# Patient Record
Sex: FEMALE | Employment: UNEMPLOYED
[De-identification: names, ages, dates, MRNs, and addresses within clinical notes are randomized per-mention and may not be internally consistent; named-entity substitution may affect disease eponyms.]

---

## 2017-09-03 ENCOUNTER — HEALTH MAINTENANCE LETTER (OUTPATIENT)
Age: 52
End: 2017-09-03

## 2018-05-03 ENCOUNTER — THERAPY VISIT (OUTPATIENT)
Dept: PHYSICAL THERAPY | Facility: CLINIC | Age: 53
End: 2018-05-03
Payer: COMMERCIAL

## 2018-05-03 DIAGNOSIS — S39.012A BACK STRAIN, INITIAL ENCOUNTER: ICD-10-CM

## 2018-05-03 DIAGNOSIS — S13.9XXA NECK SPRAIN, INITIAL ENCOUNTER: Primary | ICD-10-CM

## 2018-05-03 PROCEDURE — 97161 PT EVAL LOW COMPLEX 20 MIN: CPT | Mod: GP | Performed by: PHYSICAL THERAPIST

## 2018-05-03 PROCEDURE — 97110 THERAPEUTIC EXERCISES: CPT | Mod: GP | Performed by: PHYSICAL THERAPIST

## 2018-05-03 PROCEDURE — 97140 MANUAL THERAPY 1/> REGIONS: CPT | Mod: GP | Performed by: PHYSICAL THERAPIST

## 2018-05-03 NOTE — MR AVS SNAPSHOT
"              After Visit Summary   5/3/2018    Marni Juarez    MRN: [id number]           Patient Information     Date Of Birth          1965        Visit Information        Provider Department      5/3/2018 11:30 AM Jan Ronquillo, PT [facility]        Today's Diagnoses     Neck sprain, initial encounter    -  1    Back strain, initial encounter           Follow-ups after your visit        Your next 10 appointments already scheduled     May 07, 2018 10:50 AM CDT   [facility] with Mary Her, PTA   [facility] ([facility] [place])    [address]  [address]  [place] [id number] [phone]            May 11, 2018  8:10 AM CDT   [facility] with Jan Ronquillo, PT   [facility] ([facility])    [address] [facility] [address] [facility] [facility] [address]  [address]  [place] [id number] [phone]              Who to contact     If you have questions or need follow up information about today's clinic visit or your schedule please contact [facility] directly at [phone].  Normal or non-critical lab and imaging results will be communicated to you by [employer], letter or phone within 4 business days after the clinic has received the results. If you do not hear from us within 7 days, please contact the clinic through [employer] or phone. If you have a critical or abnormal lab result, we will notify you by phone as soon as possible.  Submit refill requests through [email or web address] or call your pharmacy and they will forward the refill request to us. Please allow 3 business days for your refill to be completed.          Additional Information About Your Visit        MyChart Information     [email or web address] lets you send messages to your doctor, view your test results, renew your prescriptions, schedule appointments and more. To sign up, go to [email or web address] . Click on \"Log in\" on the left side of the screen, which will take you to the Welcome page. Then click on \"Sign up Now\" on the right side of the page.     You will be asked " to enter the access code listed below, as well as some personal information. Please follow the directions to create your username and password.     Your access code is: [email or web address]  Expires: 2018  1:07 PM     Your access code will  in 90 days. If you need help or a new code, please call your [facility] clinic or [phone].        Care EveryWhere ID     This is your Care EveryWhere ID. This could be used by other organizations to access your [facility] medical records  [id number]         Blood Pressure from Last 3 Encounters:   No data found for BP    Weight from Last 3 Encounters:   No data found for Wt              We Performed the Following     HC PT EVAL, LOW COMPLEXITY     [facility] INITIAL EVAL REPORT     MANUAL THER TECH,1+REGIONS,EA 15 MIN     THERAPEUTIC EXERCISES        Primary Care Provider Office Phone # Fax #    Xavier Lyn [phone]       [address]  [place] [address]        Equal Access to Services     [facility]: Hadii aad ku hadasho Soomaali, waaxda luqadaha, qaybta kaalmada adeegyada, waxay idiin hayaan zacharyeg oumararamaciej la'lloyd . So [facility] [phone].    ATENCIÓN: Si habla español, tiene a russell disposición servicios gratuitos de asistencia lingüística. Llame al [phone].    We comply with applicable federal civil rights laws and [place] laws. We do not discriminate on the basis of race, color, national origin, age, disability, sex, sexual orientation, or gender identity.            Thank you!     Thank you for choosing [facility]  for your care. Our goal is always to provide you with excellent care. Hearing back from our patients is one way we can continue to improve our services. Please take a few minutes to complete the written survey that you may receive in the mail after your visit with us. Thank you!             Your Updated Medication List - Protect others around you: Learn how to safely use, store and throw away your medicines at  www.disposemymeds.org.      Notice  As of 5/3/2018  1:07 PM    You have not been prescribed any medications.
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
## 2018-05-07 ENCOUNTER — THERAPY VISIT (OUTPATIENT)
Dept: PHYSICAL THERAPY | Facility: CLINIC | Age: 53
End: 2018-05-07
Payer: COMMERCIAL

## 2018-05-07 DIAGNOSIS — S39.012A BACK STRAIN, INITIAL ENCOUNTER: ICD-10-CM

## 2018-05-07 DIAGNOSIS — S13.9XXA NECK SPRAIN, INITIAL ENCOUNTER: ICD-10-CM

## 2018-05-07 PROCEDURE — 97140 MANUAL THERAPY 1/> REGIONS: CPT | Mod: GP | Performed by: PHYSICAL THERAPIST

## 2018-05-07 PROCEDURE — 97110 THERAPEUTIC EXERCISES: CPT | Mod: GP | Performed by: PHYSICAL THERAPIST

## 2018-05-11 ENCOUNTER — THERAPY VISIT (OUTPATIENT)
Dept: PHYSICAL THERAPY | Facility: CLINIC | Age: 53
End: 2018-05-11
Payer: COMMERCIAL

## 2018-05-11 DIAGNOSIS — S13.9XXA NECK SPRAIN, INITIAL ENCOUNTER: ICD-10-CM

## 2018-05-11 DIAGNOSIS — S39.012A BACK STRAIN, INITIAL ENCOUNTER: ICD-10-CM

## 2018-05-11 PROCEDURE — 97140 MANUAL THERAPY 1/> REGIONS: CPT | Mod: GP | Performed by: PHYSICAL THERAPIST

## 2018-05-11 PROCEDURE — 97110 THERAPEUTIC EXERCISES: CPT | Mod: GP | Performed by: PHYSICAL THERAPIST

## 2018-06-04 ENCOUNTER — THERAPY VISIT (OUTPATIENT)
Dept: PHYSICAL THERAPY | Facility: CLINIC | Age: 53
End: 2018-06-04
Payer: COMMERCIAL

## 2018-06-04 DIAGNOSIS — S39.012A BACK STRAIN, INITIAL ENCOUNTER: ICD-10-CM

## 2018-06-04 DIAGNOSIS — S13.9XXA NECK SPRAIN, INITIAL ENCOUNTER: ICD-10-CM

## 2018-06-04 PROCEDURE — 97140 MANUAL THERAPY 1/> REGIONS: CPT | Mod: GP | Performed by: PHYSICAL THERAPIST

## 2018-06-04 PROCEDURE — 97110 THERAPEUTIC EXERCISES: CPT | Mod: GP | Performed by: PHYSICAL THERAPIST

## 2018-06-11 ENCOUNTER — THERAPY VISIT (OUTPATIENT)
Dept: PHYSICAL THERAPY | Facility: CLINIC | Age: 53
End: 2018-06-11
Payer: COMMERCIAL

## 2018-06-11 DIAGNOSIS — S39.012A BACK STRAIN, INITIAL ENCOUNTER: ICD-10-CM

## 2018-06-11 DIAGNOSIS — S13.9XXA NECK SPRAIN, INITIAL ENCOUNTER: ICD-10-CM

## 2018-06-11 PROCEDURE — 97140 MANUAL THERAPY 1/> REGIONS: CPT | Mod: GP | Performed by: PHYSICAL THERAPIST

## 2018-06-11 PROCEDURE — 97110 THERAPEUTIC EXERCISES: CPT | Mod: GP | Performed by: PHYSICAL THERAPIST

## 2018-06-25 ENCOUNTER — THERAPY VISIT (OUTPATIENT)
Dept: PHYSICAL THERAPY | Facility: CLINIC | Age: 53
End: 2018-06-25
Payer: COMMERCIAL

## 2018-06-25 DIAGNOSIS — S13.9XXA NECK SPRAIN, INITIAL ENCOUNTER: ICD-10-CM

## 2018-06-25 DIAGNOSIS — S39.012A BACK STRAIN, INITIAL ENCOUNTER: ICD-10-CM

## 2018-06-25 PROCEDURE — 97110 THERAPEUTIC EXERCISES: CPT | Mod: GP | Performed by: PHYSICAL THERAPIST

## 2018-06-25 PROCEDURE — 97140 MANUAL THERAPY 1/> REGIONS: CPT | Mod: GP | Performed by: PHYSICAL THERAPIST

## 2018-07-02 ENCOUNTER — THERAPY VISIT (OUTPATIENT)
Dept: PHYSICAL THERAPY | Facility: CLINIC | Age: 53
End: 2018-07-02
Payer: COMMERCIAL

## 2018-07-02 DIAGNOSIS — S39.012A BACK STRAIN, INITIAL ENCOUNTER: ICD-10-CM

## 2018-07-02 DIAGNOSIS — S13.9XXA NECK SPRAIN, INITIAL ENCOUNTER: ICD-10-CM

## 2018-07-02 PROCEDURE — 97140 MANUAL THERAPY 1/> REGIONS: CPT | Mod: GP | Performed by: PHYSICAL THERAPIST

## 2018-07-02 PROCEDURE — 97110 THERAPEUTIC EXERCISES: CPT | Mod: GP | Performed by: PHYSICAL THERAPIST

## 2018-07-05 ENCOUNTER — THERAPY VISIT (OUTPATIENT)
Dept: PHYSICAL THERAPY | Facility: CLINIC | Age: 53
End: 2018-07-05
Payer: COMMERCIAL

## 2018-07-05 DIAGNOSIS — S13.9XXA NECK SPRAIN, INITIAL ENCOUNTER: ICD-10-CM

## 2018-07-05 DIAGNOSIS — S39.012A BACK STRAIN, INITIAL ENCOUNTER: ICD-10-CM

## 2018-07-05 PROCEDURE — 97110 THERAPEUTIC EXERCISES: CPT | Mod: GP | Performed by: PHYSICAL THERAPIST

## 2018-07-05 PROCEDURE — 97140 MANUAL THERAPY 1/> REGIONS: CPT | Mod: GP | Performed by: PHYSICAL THERAPIST

## 2018-07-09 ENCOUNTER — THERAPY VISIT (OUTPATIENT)
Dept: PHYSICAL THERAPY | Facility: CLINIC | Age: 53
End: 2018-07-09
Payer: COMMERCIAL

## 2018-07-09 DIAGNOSIS — S39.012A BACK STRAIN, INITIAL ENCOUNTER: ICD-10-CM

## 2018-07-09 DIAGNOSIS — S13.9XXA NECK SPRAIN, INITIAL ENCOUNTER: ICD-10-CM

## 2018-07-09 PROCEDURE — 97110 THERAPEUTIC EXERCISES: CPT | Mod: GP | Performed by: PHYSICAL THERAPIST

## 2018-07-09 PROCEDURE — 97112 NEUROMUSCULAR REEDUCATION: CPT | Mod: GP | Performed by: PHYSICAL THERAPIST

## 2018-07-09 PROCEDURE — 97140 MANUAL THERAPY 1/> REGIONS: CPT | Mod: GP | Performed by: PHYSICAL THERAPIST

## 2018-07-12 ENCOUNTER — THERAPY VISIT (OUTPATIENT)
Dept: PHYSICAL THERAPY | Facility: CLINIC | Age: 53
End: 2018-07-12
Payer: COMMERCIAL

## 2018-07-12 DIAGNOSIS — S13.9XXA NECK SPRAIN, INITIAL ENCOUNTER: ICD-10-CM

## 2018-07-12 DIAGNOSIS — S39.012A BACK STRAIN, INITIAL ENCOUNTER: ICD-10-CM

## 2018-07-12 PROCEDURE — 97110 THERAPEUTIC EXERCISES: CPT | Mod: GP

## 2018-07-12 PROCEDURE — 97140 MANUAL THERAPY 1/> REGIONS: CPT | Mod: GP

## 2018-07-16 ENCOUNTER — THERAPY VISIT (OUTPATIENT)
Dept: PHYSICAL THERAPY | Facility: CLINIC | Age: 53
End: 2018-07-16
Payer: COMMERCIAL

## 2018-07-16 DIAGNOSIS — S39.012A BACK STRAIN, INITIAL ENCOUNTER: ICD-10-CM

## 2018-07-16 DIAGNOSIS — S13.9XXA NECK SPRAIN, INITIAL ENCOUNTER: ICD-10-CM

## 2018-07-16 PROCEDURE — 97110 THERAPEUTIC EXERCISES: CPT | Mod: GP | Performed by: PHYSICAL THERAPIST

## 2018-07-16 PROCEDURE — 97140 MANUAL THERAPY 1/> REGIONS: CPT | Mod: GP | Performed by: PHYSICAL THERAPIST

## 2018-07-19 ENCOUNTER — THERAPY VISIT (OUTPATIENT)
Dept: PHYSICAL THERAPY | Facility: CLINIC | Age: 53
End: 2018-07-19
Payer: COMMERCIAL

## 2018-07-19 DIAGNOSIS — S39.012A BACK STRAIN, INITIAL ENCOUNTER: ICD-10-CM

## 2018-07-19 DIAGNOSIS — S13.9XXA NECK SPRAIN, INITIAL ENCOUNTER: ICD-10-CM

## 2018-07-19 PROCEDURE — 97110 THERAPEUTIC EXERCISES: CPT | Mod: GP | Performed by: PHYSICAL THERAPIST

## 2018-07-19 PROCEDURE — 97140 MANUAL THERAPY 1/> REGIONS: CPT | Mod: GP | Performed by: PHYSICAL THERAPIST

## 2018-07-24 ENCOUNTER — THERAPY VISIT (OUTPATIENT)
Dept: PHYSICAL THERAPY | Facility: CLINIC | Age: 53
End: 2018-07-24
Payer: COMMERCIAL

## 2018-07-24 DIAGNOSIS — S39.012A BACK STRAIN, INITIAL ENCOUNTER: ICD-10-CM

## 2018-07-24 DIAGNOSIS — S13.9XXA NECK SPRAIN, INITIAL ENCOUNTER: ICD-10-CM

## 2018-07-24 PROCEDURE — 97140 MANUAL THERAPY 1/> REGIONS: CPT | Mod: GP | Performed by: PHYSICAL THERAPIST

## 2018-07-24 PROCEDURE — 97110 THERAPEUTIC EXERCISES: CPT | Mod: GP | Performed by: PHYSICAL THERAPIST

## 2018-07-26 ENCOUNTER — THERAPY VISIT (OUTPATIENT)
Dept: PHYSICAL THERAPY | Facility: CLINIC | Age: 53
End: 2018-07-26
Payer: COMMERCIAL

## 2018-07-26 DIAGNOSIS — S13.9XXA NECK SPRAIN, INITIAL ENCOUNTER: ICD-10-CM

## 2018-07-26 DIAGNOSIS — S39.012A BACK STRAIN, INITIAL ENCOUNTER: ICD-10-CM

## 2018-07-26 PROCEDURE — 97110 THERAPEUTIC EXERCISES: CPT | Mod: GP | Performed by: PHYSICAL THERAPIST

## 2018-07-26 PROCEDURE — 97140 MANUAL THERAPY 1/> REGIONS: CPT | Mod: GP | Performed by: PHYSICAL THERAPIST

## 2018-07-30 ENCOUNTER — THERAPY VISIT (OUTPATIENT)
Dept: PHYSICAL THERAPY | Facility: CLINIC | Age: 53
End: 2018-07-30
Payer: COMMERCIAL

## 2018-07-30 DIAGNOSIS — S39.012A BACK STRAIN, INITIAL ENCOUNTER: ICD-10-CM

## 2018-07-30 DIAGNOSIS — S13.9XXA NECK SPRAIN, INITIAL ENCOUNTER: ICD-10-CM

## 2018-07-30 PROCEDURE — 97140 MANUAL THERAPY 1/> REGIONS: CPT | Mod: GP | Performed by: PHYSICAL THERAPIST

## 2018-07-30 PROCEDURE — 97110 THERAPEUTIC EXERCISES: CPT | Mod: GP | Performed by: PHYSICAL THERAPIST

## 2018-08-03 ENCOUNTER — THERAPY VISIT (OUTPATIENT)
Dept: PHYSICAL THERAPY | Facility: CLINIC | Age: 53
End: 2018-08-03
Payer: COMMERCIAL

## 2018-08-03 DIAGNOSIS — S39.012A BACK STRAIN, INITIAL ENCOUNTER: ICD-10-CM

## 2018-08-03 DIAGNOSIS — S13.9XXA NECK SPRAIN, INITIAL ENCOUNTER: ICD-10-CM

## 2018-08-03 PROCEDURE — 97110 THERAPEUTIC EXERCISES: CPT | Mod: GP | Performed by: PHYSICAL THERAPIST

## 2018-08-03 PROCEDURE — 97140 MANUAL THERAPY 1/> REGIONS: CPT | Mod: GP | Performed by: PHYSICAL THERAPIST

## 2018-08-13 ENCOUNTER — THERAPY VISIT (OUTPATIENT)
Dept: PHYSICAL THERAPY | Facility: CLINIC | Age: 53
End: 2018-08-13
Payer: COMMERCIAL

## 2018-08-13 DIAGNOSIS — S39.012A BACK STRAIN, INITIAL ENCOUNTER: ICD-10-CM

## 2018-08-13 DIAGNOSIS — S13.9XXA NECK SPRAIN, INITIAL ENCOUNTER: ICD-10-CM

## 2018-08-13 PROCEDURE — 97140 MANUAL THERAPY 1/> REGIONS: CPT | Mod: GP

## 2018-08-13 PROCEDURE — 97110 THERAPEUTIC EXERCISES: CPT | Mod: GP

## 2018-08-17 ENCOUNTER — THERAPY VISIT (OUTPATIENT)
Dept: PHYSICAL THERAPY | Facility: CLINIC | Age: 53
End: 2018-08-17
Payer: COMMERCIAL

## 2018-08-17 DIAGNOSIS — S39.012A BACK STRAIN, INITIAL ENCOUNTER: ICD-10-CM

## 2018-08-17 DIAGNOSIS — S13.9XXA NECK SPRAIN, INITIAL ENCOUNTER: ICD-10-CM

## 2018-08-17 PROCEDURE — 97012 MECHANICAL TRACTION THERAPY: CPT | Mod: GP | Performed by: PHYSICAL THERAPIST

## 2018-08-17 PROCEDURE — 97110 THERAPEUTIC EXERCISES: CPT | Mod: GP | Performed by: PHYSICAL THERAPIST

## 2018-08-20 ENCOUNTER — THERAPY VISIT (OUTPATIENT)
Dept: PHYSICAL THERAPY | Facility: CLINIC | Age: 53
End: 2018-08-20
Payer: COMMERCIAL

## 2018-08-20 DIAGNOSIS — S39.012A BACK STRAIN, INITIAL ENCOUNTER: ICD-10-CM

## 2018-08-20 DIAGNOSIS — S13.9XXA NECK SPRAIN, INITIAL ENCOUNTER: ICD-10-CM

## 2018-08-20 PROCEDURE — 97110 THERAPEUTIC EXERCISES: CPT | Mod: GP | Performed by: PHYSICAL THERAPIST

## 2018-08-20 PROCEDURE — 97012 MECHANICAL TRACTION THERAPY: CPT | Mod: GP | Performed by: PHYSICAL THERAPIST

## 2018-08-24 ENCOUNTER — THERAPY VISIT (OUTPATIENT)
Dept: PHYSICAL THERAPY | Facility: CLINIC | Age: 53
End: 2018-08-24
Payer: COMMERCIAL

## 2018-08-24 DIAGNOSIS — S13.9XXA NECK SPRAIN, INITIAL ENCOUNTER: ICD-10-CM

## 2018-08-24 DIAGNOSIS — S39.012A BACK STRAIN, INITIAL ENCOUNTER: ICD-10-CM

## 2018-08-24 PROCEDURE — 97012 MECHANICAL TRACTION THERAPY: CPT | Mod: GP | Performed by: PHYSICAL THERAPIST

## 2018-08-24 PROCEDURE — 97140 MANUAL THERAPY 1/> REGIONS: CPT | Mod: GP | Performed by: PHYSICAL THERAPIST

## 2018-08-24 PROCEDURE — 97110 THERAPEUTIC EXERCISES: CPT | Mod: GP | Performed by: PHYSICAL THERAPIST

## 2018-08-24 NOTE — LETTER
[facility]  [address]  [address]  [place] [address]  [phone]    2018    Re: Marni Juarez   :   1965  MRN:  [id number]   REFERRING PHYSICIAN:   MD SHEILA Goodwin PT  Date of Initial Evaluation:  2018  Visits:  Rxs Used: 20  Reason for Referral:     Neck sprain, initial encounter  Back strain, initial encounter    EVALUATION SUMMARY    PROGRESS  REPORT  Progress reporting period is from 5.3 to 8..18.     SUBJECTIVE  Subjective: more pain over my sacrum/LS, twinges into my gluts, feel im able to walk easier since traction , neck/HA pain is less overall but remains,  PT is helping    Current Pain level: 7/10   Initial Pain level: 10/10   Changes in function: Yes, see goal flow sheet for change in function   Adverse reactions: None;   ,     The subjective and objective information are from the last SOAP note on this patient.    OBJECTIVE  Objective: 20 visits to date.  Findings: numerous triggers of gluts/multif, moderate loss of LS extension, functional AROM but reports of stiffness/tightness, sharp pain limiting activities.  Pt remains motivated/compliant but her anxiety is a hinderance to therapy. Pt remains guarded, self restricting, anxious about PT progression/increases.        ASSESSMENT/PLAN  Updated problem list and treatment plan: Diagnosis 1:  LBP  Pain -  mechanical traction, manual therapy, self management and home program  Decreased ROM/flexibility - manual therapy, therapeutic exercise and home program  Decreased joint mobility - manual therapy and therapeutic exercise  Impaired muscle performance - neuro re-education  Decreased function - therapeutic activities    Re: Marni Juarez   :   1965    Diagnosis 2:  Neck/HA pain    Pain -  mechanical traction, manual therapy, self management, directional preference exercise and home program  Decreased ROM/flexibility - manual therapy and therapeutic exercise  Decreased joint  mobility - manual therapy and therapeutic exercise  Decreased proprioception - neuro re-education and therapeutic activities  Impaired muscle performance - neuro re-education  Decreased function - therapeutic activities  Impaired posture - neuro re-education  STG/LTGs have been met or progress has been made towards goals:  Yes (See Goal flow sheet completed today.)  Assessment of Progress: The patient's condition is improving.  Patient is meeting short term goals and is progressing towards long term goals.  Self Management Plans:  Patient has been instructed in a home treatment program.  Patient  has been instructed in self management of symptoms.  I have re-evaluated this patient and find that the nature, scope, duration and intensity of the therapy is appropriate for the medical condition of the patient.  Enrica continues to require the following intervention to meet STG and LTG's: PT, theraputic exs, manual therapies, traction.   The patient is returning to your office for a recheck appointment.    Recommendations:  This patient would benefit from continued therapy.     Frequency:  1 X week, once daily  Duration:  for 8 weeks    Please refer to the daily flowsheet for treatment today, total treatment time and time spent performing 1:1 timed codes.    Thank you for your referral.    INQUIRIES  Therapist: Jan Ronquillo PT   [facility]  [address]  [address]  [place] [address]  Phone: [phone]  Fax: [phone]

## 2018-08-24 NOTE — PROGRESS NOTES
Subjective:  HPI                    Objective:  System    Physical Exam    General     ROS    Assessment/Plan:    PROGRESS  REPORT    Progress reporting period is from 5.3 to 8.24.18.     SUBJECTIVE  Subjective: more pain over my sacrum/LS, twinges into my gluts, feel im able to walk easier since traction , neck/HA pain is less overall but remains,  PT is helping    Current Pain level: 7/10   Initial Pain level: 10/10   Changes in function: Yes, see goal flow sheet for change in function   Adverse reactions: None;   ,     The subjective and objective information are from the last SOAP note on this patient.    OBJECTIVE  Objective: 20 visits to date.  Findings: numerous triggers of gluts/multif, moderate loss of LS extension, functional AROM but reports of stiffness/tightness, sharp pain limiting activities.  Pt remains motivated/compliant but her anxiety is a hinderance to therapy. Pt remains guarded, self restricting, anxious about PT progression/increases.        ASSESSMENT/PLAN  Updated problem list and treatment plan: Diagnosis 1:  LBP  Pain -  mechanical traction, manual therapy, self management and home program  Decreased ROM/flexibility - manual therapy, therapeutic exercise and home program  Decreased joint mobility - manual therapy and therapeutic exercise  Impaired muscle performance - neuro re-education  Decreased function - therapeutic activities  Diagnosis 2:  Neck/HA pain    Pain -  mechanical traction, manual therapy, self management, directional preference exercise and home program  Decreased ROM/flexibility - manual therapy and therapeutic exercise  Decreased joint mobility - manual therapy and therapeutic exercise  Decreased proprioception - neuro re-education and therapeutic activities  Impaired muscle performance - neuro re-education  Decreased function - therapeutic activities  Impaired posture - neuro re-education  STG/LTGs have been met or progress has been made towards goals:  Yes (See Goal  flow sheet completed today.)  Assessment of Progress: The patient's condition is improving.  Patient is meeting short term goals and is progressing towards long term goals.  Self Management Plans:  Patient has been instructed in a home treatment program.  Patient  has been instructed in self management of symptoms.  I have re-evaluated this patient and find that the nature, scope, duration and intensity of the therapy is appropriate for the medical condition of the patient.  Enrica continues to require the following intervention to meet STG and LTG's: PT, theraputic exs, manual therapies, traction.   The patient is returning to your office for a recheck appointment.    Recommendations:  This patient would benefit from continued therapy.     Frequency:  1 X week, once daily  Duration:  for 8 weeks        Please refer to the daily flowsheet for treatment today, total treatment time and time spent performing 1:1 timed codes.

## 2018-08-28 ENCOUNTER — THERAPY VISIT (OUTPATIENT)
Dept: PHYSICAL THERAPY | Facility: CLINIC | Age: 53
End: 2018-08-28
Payer: COMMERCIAL

## 2018-08-28 DIAGNOSIS — S39.012A BACK STRAIN, INITIAL ENCOUNTER: ICD-10-CM

## 2018-08-28 DIAGNOSIS — S13.9XXA NECK SPRAIN, INITIAL ENCOUNTER: ICD-10-CM

## 2018-08-28 PROCEDURE — 97012 MECHANICAL TRACTION THERAPY: CPT | Mod: GP | Performed by: PHYSICAL THERAPIST

## 2018-08-28 PROCEDURE — 97110 THERAPEUTIC EXERCISES: CPT | Mod: GP | Performed by: PHYSICAL THERAPIST

## 2018-08-28 NOTE — MR AVS SNAPSHOT
After Visit Summary   8/28/2018    Marni Juarez    MRN: [id number]           Patient Information     Date Of Birth          1965        Visit Information        Provider Department      8/28/2018 10:50 AM Jan Ronquillo, PT [facility]        Today's Diagnoses     Neck sprain, initial encounter        Back strain, initial encounter           Follow-ups after your visit        Your next 10 appointments already scheduled     Aug 31, 2018 10:50 AM CDT   [facility] Spine with Jan Ronquillo, PT   [facility] ([facility] [place])    [address]  [address]  [place] [address]   [phone]            Sep 06, 2018 10:10 AM CDT   S[facility]IL[facility] Spine with Jan Ronquillo, PT   [facility] ([facility] [place])    [address]  [address]  [place] [address]   [phone]            Sep 10, 2018 10:50 AM CDT   S[facility]IL[facility] Spine with Mary Her, PTA   [facility] ([facility] [place])    [address]  [address]  [place] [address]   [phone]              Who to contact     If you have questions or need follow up information about today's clinic visit or your schedule please contact [facility] directly at [phone].  Normal or non-critical lab and imaging results will be communicated to you by MyChart, letter or phone within 4 business days after the clinic has received the results. If you do not hear from us within 7 days, please contact the clinic through MyChart or phone. If you have a critical or abnormal lab result, we will notify you by phone as soon as possible.  Submit refill requests through [employer] or call your pharmacy and they will forward the refill request to us. Please allow 3 business days for your refill to be completed.          Additional Information About Your Visit        Care EveryWhere ID     This is your Care EveryWhere ID. This could be used by other organizations to access your [facility] medical records  [id number]         Blood Pressure from Last 3  Encounters:   No data found for BP    Weight from Last 3 Encounters:   No data found for Wt              We Performed the Following     MECHANICAL TRACTION THERAPY     THERAPEUTIC EXERCISES        Primary Care Provider Office Phone # Fax #    Xavier Lyn [phone] [address] [place] [address]        Equal Access to Services     [facility]: Hadii aad ku hadasho Soomaali, waaxda luqadaha, qaybta kaalmada adeegyada, waxay idiin hayaan adeeg kharash la'aan . So [facility] [phone].    ATENCIÓN: Si habla español, tiene a russell disposición servicios gratuitos de asistencia lingüística. Llame al [phone].    We comply with applicable federal civil rights laws and [place] laws. We do not discriminate on the basis of race, color, national origin, age, disability, sex, sexual orientation, or gender identity.            Thank you!     Thank you for choosing [facility]  for your care. Our goal is always to provide you with excellent care. Hearing back from our patients is one way we can continue to improve our services. Please take a few minutes to complete the written survey that you may receive in the mail after your visit with us. Thank you!             Your Updated Medication List - Protect others around you: Learn how to safely use, store and throw away your medicines at www.disposemymeds.org.      Notice  As of 8/28/2018  1:19 PM    You have not been prescribed any medications.

## 2018-11-30 PROBLEM — S13.9XXA NECK SPRAIN, INITIAL ENCOUNTER: Status: RESOLVED | Noted: 2018-05-03 | Resolved: 2018-11-30

## 2018-11-30 PROBLEM — S39.012A BACK STRAIN, INITIAL ENCOUNTER: Status: RESOLVED | Noted: 2018-05-03 | Resolved: 2018-11-30

## 2018-11-30 NOTE — PROGRESS NOTES
Subjective:  HPI                    Objective:  System    Physical Exam    General     ROS    Assessment/Plan:    DISCHARGE REPORT    Progress reporting period is from 5.3 to 8.24.18.     SUBJECTIVE  Subjective: pt describes mch worse LB pain today and since the weekend, she did alot of light lifting but doesnt think it hurt her. was to [place] yesterday    Current Pain level: 6/10   Initial Pain level: 10/10   Changes in function: No changes noted in function since last SOAP note   Adverse reactions: None;   ,     Patient has failed to return to therapy so current objective findings are unknown.    OBJECTIVE  Objective: pt is very self restricting today, seems very agitated and irritated,  Overall ROM is improved and focus of her compliants is her LB.       ASSESSMENT/PLAN  Updated problem list and treatment plan: Diagnosis 1:  Neck/HA pain     STG/LTGs have been met or progress has been made towards goals:  None  Assessment of Progress: Patient has not returned to therapy.  Current status is unknown and discharge G code cannot be reported.  Self Management Plans:  Patient has been instructed in a home treatment program.  Patient  has been instructed in self management of symptoms.    Marni continues to require the following intervention to meet STG and LTG's:   The patient failed to complete scheduled/ordered appointments so current information is unknown.  We will discharge this patient from PT.    Recommendations:      Please refer to the daily flowsheet for treatment today, total treatment time and time spent performing 1:1 timed codes.

## 2020-04-13 ENCOUNTER — TRANSFERRED RECORDS (OUTPATIENT)
Dept: HEALTH INFORMATION MANAGEMENT | Facility: CLINIC | Age: 55
End: 2020-04-13

## 2020-07-07 ENCOUNTER — TRANSFERRED RECORDS (OUTPATIENT)
Dept: HEALTH INFORMATION MANAGEMENT | Facility: CLINIC | Age: 55
End: 2020-07-07

## 2020-07-20 ENCOUNTER — TRANSFERRED RECORDS (OUTPATIENT)
Dept: HEALTH INFORMATION MANAGEMENT | Facility: CLINIC | Age: 55
End: 2020-07-20

## 2020-08-14 ENCOUNTER — TRANSFERRED RECORDS (OUTPATIENT)
Dept: HEALTH INFORMATION MANAGEMENT | Facility: CLINIC | Age: 55
End: 2020-08-14

## 2020-08-18 ENCOUNTER — TRANSCRIBE ORDERS (OUTPATIENT)
Dept: OTHER | Age: 55
End: 2020-08-18

## 2020-08-18 DIAGNOSIS — H81.09 MENIERE'S DISEASE, UNSPECIFIED LATERALITY: Primary | ICD-10-CM

## 2020-08-18 DIAGNOSIS — R42 VERTIGO: ICD-10-CM

## 2020-08-26 ENCOUNTER — TELEPHONE (OUTPATIENT)
Dept: OTOLARYNGOLOGY | Facility: CLINIC | Age: 55
End: 2020-08-26

## 2020-08-26 NOTE — TELEPHONE ENCOUNTER
FUTURE VISIT INFORMATION      FUTURE VISIT INFORMATION:    Date: 10/20/2020    Time: 11:30AM    [place] [facility] [place] INFORMATION:    Referring provider:  Raz Almaguer DO     Referring providers clinic:  [facility]     Reason for visit/diagnosis  Telephone Visit- Vertigo- Referred by [facility]    RECORDS REQUESTED FROM:       Clinic name Comments Records Status Imaging Status   [facility]  8/14/2020 note from Raz Almaguer DO   7/7/2020 Audiogram  Sent to scan 8/26/2020 - requested more recs 8/26    [facility]    4/13/2020 Urgent Care note req 8/26/2020    req 9/4/2020 - sent to scan     [facility] 7/20/2020 MRI Brain IAC req 8/26/2020 - sent to scan 8/27 req 8/26/2020    req 9/4/2020 - PACS                        8/26/2020 2:17PM sent a fax to [facility] for recs, [facility] for images, and images and [facility] for more recs - Amay   8/27/2020 9:36AM report and images from [facility] received, waiting for [facility] recs - Amay   9/4/2020 7:07AM sent a 2nd fax to [facility] for recs and [facility] for images - Amay   *urgent care note received from [place] and images received from [facility]. Sent a message to Audiology to review - Amay

## 2020-08-26 NOTE — TELEPHONE ENCOUNTER
1. Have you noticed any changes in hearing? Yes  2. Do you have ringing, buzzing, or other sounds in your ears or head, this is also referred to as Tinnitus? Yes  3. When and where was your last hearing test? [facility] 7/2020  4. Do you feel lightheaded or foggy? Yes  5. Do you have a spinning sensation? Yes  6. Is there any specific position that can bring on dizziness? random  7. Does looking up cause dizziness? Yes  8. Does getting in and our of bed cause dizziness? No  9. Does turning over in bed increase or cause dizziness? Sometimes: sometimes   10. Does bending over cause dizziness? Sometimes: sometimes   11. Is there anything that you can do to prevent the dizziness? medicine  12. Has the dizziness gotten better with time? No  13. Have you seen Physical Therapy for dizziness? (Please indicate clinic and as much of the location as possible): No  14. Are you being referred to a specific physician? No  15. Have you been evaluated/treated for your dizziness at any other location?  (If yes,obtian as much clinic/provider/locaiton as possible) Yes. (If yes answer the following questions:)   Have you seen any ENT, Neurology, or other providers for these symptoms?             Yes, If yes, where? [facility], [facility]   if yes, who?    Have you had any balance or Audiology testing? No Have you had an MRI or CT scan of your head or neck? Yes, If yes, where? [facility] 7/20 if yes, who?     Would you like to receive your Release of Information by mail or e-mail?  mail

## 2020-09-08 DIAGNOSIS — R42 DIZZINESS: Primary | ICD-10-CM

## 2020-09-08 NOTE — TELEPHONE ENCOUNTER
Orders requested for hearing test, VNG/rotary chair, and Ecog testing.    Per Chart Review: Patient is being referred for right Meniere's disease.    Was seen at [facility] on 4/13/20 for dizziness and nausea, which was worsened by movement. Patient had also reported some tinnitus and aural fullness at the time. She was prescribed meclizine and Zofran.    [facility]: Per 8/14/20 note, patient has a recent diagnosis of right Meniere's disease. She has reportedly had multiple episodes of vertigo with nausea and vomiting, has right sensorineural hearing loss (no audiogram to review), and bilateral tinnitus. She reportedly adheres to a low salt diet and limits her caffeine and alcohol intake. A diuretic was recommended.     Other pertinent medical information: Absent left eye    MRI: 7/20/20- Impressions  - Normal internal auditory canals  - Encephalomalacia & gliosis in left anterior lobe may be due to previous ischemic or traumatic insult. Also chronic microangiopathic changes without evidence of acute intracranial pathology.  - Post-operative changes left orbit with an absent globe. Also a hypointense 12mm implant in left lateral orbit.    Shaheen Galeano.  Licensed Audiologist  [place] # [id number]

## 2020-09-24 ENCOUNTER — TRANSFERRED RECORDS (OUTPATIENT)
Dept: PHYSICAL THERAPY | Facility: CLINIC | Age: 55
End: 2020-09-24

## 2020-10-02 ENCOUNTER — OFFICE VISIT (OUTPATIENT)
Dept: AUDIOLOGY | Facility: CLINIC | Age: 55
End: 2020-10-02
Payer: COMMERCIAL

## 2020-10-02 ENCOUNTER — OFFICE VISIT (OUTPATIENT)
Dept: AUDIOLOGY | Facility: CLINIC | Age: 55
End: 2020-10-02
Attending: OTOLARYNGOLOGY
Payer: COMMERCIAL

## 2020-10-02 DIAGNOSIS — H90.71 MIXED CONDUCTIVE AND SENSORINEURAL HEARING LOSS OF RIGHT EAR WITH UNRESTRICTED HEARING OF LEFT EAR: Primary | ICD-10-CM

## 2020-10-02 DIAGNOSIS — R42 DIZZINESS AND GIDDINESS: Primary | ICD-10-CM

## 2020-10-02 PROCEDURE — 92546 SINUSOIDAL ROTATIONAL TEST: CPT | Performed by: AUDIOLOGIST

## 2020-10-02 PROCEDURE — 92565 STENGER TEST PURE TONE: CPT | Performed by: AUDIOLOGIST

## 2020-10-02 PROCEDURE — 92557 COMPREHENSIVE HEARING TEST: CPT | Performed by: AUDIOLOGIST

## 2020-10-02 PROCEDURE — 92550 TYMPANOMETRY & REFLEX THRESH: CPT | Performed by: AUDIOLOGIST

## 2020-10-02 PROCEDURE — 92537 CALORIC VSTBLR TEST W/REC: CPT | Performed by: AUDIOLOGIST

## 2020-10-02 PROCEDURE — 92541 SPONTANEOUS NYSTAGMUS TEST: CPT | Performed by: AUDIOLOGIST

## 2020-10-02 PROCEDURE — 92542 POSITIONAL NYSTAGMUS TEST: CPT | Mod: 59 | Performed by: AUDIOLOGIST

## 2020-10-02 PROCEDURE — 92584 ELECTROCOCHLEOGRAPHY: CPT | Performed by: AUDIOLOGIST

## 2020-10-02 PROCEDURE — 92545 OSCILLATING TRACKING TEST: CPT | Mod: 59 | Performed by: AUDIOLOGIST

## 2020-10-02 NOTE — PROGRESS NOTES
AUDIOLOGY REPORT    SUMMARY: Audiology visit completed. See audiogram for results.      RECOMMENDATIONS: Follow-up with ENT.    Kushal Iniguez, [facility]  Licensed Audiologist  [place] #[id number]

## 2020-10-02 NOTE — PROGRESS NOTES
AUDIOLOGY REPORT-BALANCE ASSESSMENT    SUBJECTIVE: Marni Juarez, 55 year old, was seen in Audiology at the [facility] on 10/2/2020, for videonystagmography (VNG) and rotational chair testing referred by Rick Nissen, M.D. Patient presents with chief complaints of dizziness, vertigo, nausea, right tinnitus, and imbalance.     Patient reports symptoms originally onset 26 years ago while she was living in [place]. Symptoms then resolved until approximately one year later when she experienced a similar episode. She was then without symptoms until recently. In April 2020, patient reports experiencing a sudden onset of vertigo, dizziness, imbalance and nausea which resulted in emesis while sitting at her kitchen table. She sought emergency medical evaluation with [facility] and was prescribed a steroid, meclizine and Zofran. Patient also noted some right aural fullness and tinnitus at this time, which improved following her prescription of steroids. She does continue to note some persistent tinnitus in her right ear. Patient reports she was then asymptomatic until the second and third weeks of August 2020. Over the course of these two weeks, patient reports experiencing another six episodes; three while driving and three while doing other things. Patient describes her episodes as lasting hours in duration. Symptoms can be provoked or exacerbated by any positional changes, fast head or body movements, and with any eye movements, even minor eye movements. Patient then reports seeking evaluation with an otolaryngologist in [place]. She was reportedly prescribed another dose of steroids and a diuretic as Meniere's disease was suspected. Today, she reports she has been without vertigo since August. She reports experiencing a few small momentary incidences of dizziness upon bending forward a few days ago. She has otherwise been without symptoms.     Patient reports  "history of cancer as a child which resulted in the need for surgical removal of her left eye. She now utilizes her right eye only for vision and wears an eye patch over her left orbital area. Patient denies history of headaches, migraines, sensitivity to loud sounds, head injuries or concussions. Patient reports some sensitivity to bright lights. She denies tullio's phenomenon, hennebert's sign, and autophony. Patient denies sensation of persistent motion post cessation of motion. She denies sensation of motion while still. She denies double or blurred vision. Patient reports difficulty falling asleep at night. She also notes waking up in the middle of the night to her \"house shaking\" approximately four times a year. Patient reports additional medical history includes; blocked sublingual gland with subsequent surgery following a bout of the flu, bronchitis which turned into sinus concerns (winter 2019), branch retinal vein occlusion (1 year ago) with more recent inflammation in the same area in her right eye, and left knee injury with subsequent physical therapy (1 year ago). Family history is negative for hearing loss, migraines and vertigo. Patient denies consumption of caffeinated beverages, nicotine, alcoholic substances, or use of medications with known vestibular interactions within the past 48 hours.    OBJECTIVE:    Dizziness Handicap Inventory (DHI): Patient did not complete.    Rotational chair testing:   Sinusoidal harmonic acceleration test: 0.01, 0.02, 0.04, 0.08, 0.16, 0.32, and 0.64 Hz.  Spontaneous nystagmus: right beats  Phase: Normal rising to abnormal high frequency phase lead (0.16-0.64 Hz)  Gain: Normal  Symmetry: Left asymmetry (0.02-0.64 Hz)  Spectral Purity: Good  Overall rotational chair test: Normal gain, normal rising to abnormal high frequency phase lead (0.16-0.64 Hz), left asymmetry (0.02-0.64 Hz) and good spectral purity; pattern suggests central influences. *Left asymmetry likely due " to spontaneous right beating nystagmus.     Videonystagmography (VNG) testing:  Prescreening:  Tympanograms: Normal eardrum mobility bilaterally. Note: this test is completed to determine the status of the middle ear before irrigations are completed. *Patient see audiogram performed earlier today for tympanograms.   Ocular range of motion and ocular counter roll: Normal  Cross/cover: Normal  Head Thrust: Negative     Nystagmus Tests:  Gaze-Horizontal with Fixation:   Center: 1-2 deg/s right beating nystagmus   Right: slight 1 deg/s right beating nystagmus   Left: intermittent 1 deg/s right beating nystagmus, patient had difficulty maintaining appropriate gaze position.   Gaze-Vertical with Fixation:   Up: intermittent slight 1-2 deg/s right beating nystagmus   Down: intermittent slight 1-2 deg/s right beating nystagmus  Gaze with Fixation Denied   Center: 4 deg/s right beating nystagmus decreases to 2-3 deg/s right beating nystagmus with fixation but unable to suppress with fixation.    Right: 3 deg/s right beating nystagmus   Left: 1 deg/s right beating nystagmus   Up: 4 deg/s right beating nystagmus  High Frequency Headshake:   Horizontal: Positive. 9 deg/s right beating nystagmus, patient reports mild dizziness post headshake.    Vertical: Positive. 5 deg/s right beating nystagmus, no symptoms.     Anvneet-Hallpike Head Right: Negative for PC BPPV. Slight intermittent 1-2 deg/s right beating nystagmus mixed with eyeblinks. No symptoms.   Navneet-Hallpike Head Left: Negative for PC BPPV. Non-fatiguing 1 deg/s right beating nystagmus, no symptoms.   Roll Test Head Right: Negative for HC BPPV. 1-2 deg/s right beating nystagmus, no symptoms.   Roll Test Head Left: Negative for HC BPPV. Intermittent 1 deg/s right beating nystagmus, no symptoms.     Positional Testing:  Positionals: Supine: 2 deg/s right beating nystagmus, unable to suppress with fixation. No symptoms.   Positionals: Body Right: 3-4 deg/s right beating  nystagmus, unable to suppress with fixation. No symptoms.   Positionals: Body Left: 2 deg/s right beating nystagmus mixed with eye blinks, unable to suppress with fixation. No symptoms.   Positionals: Pre-Caloric: 2 deg/s right beating nystagmus, unable to suppress with fixation. No symptoms.     Oculomotor Tests:  Saccades: Essentially normal upon repeat.  Pursuit(repeatable): Abnormal with overshoot and mild saccadic intrusions throughout.     Calorics:  (Tested at 44 degrees and 30 degrees Celsius for 30 seconds for warm and cool water, respectively):  Right Warm Eye Speed: 75 degrees per second right beating  Left Warm Eye Speed: 44 degrees per second left beating  Right Cool Eye Speed: 27 degrees per second left beating  Left Cool Eye Speed: 29 degrees per second right beating  Difference between ear: 17% left hypofunction. (Greater than 25% considered clinically significant.)  Fixation Index: 0.04 Normal  Overall caloric test: Normal    ASSESSMENT:  1. Indications of central vestibular system involvement noted on today's exam were as follows:     -Spontaneous right beating nystagmus (1-4 deg/s) present with and without fixation evident throughout all testing.     -Abnormal Pursuit testing; repeatable.    -Abnormal Rotary Chair Testing; pattern suggests central influences.     2. There were no significant indications of peripheral vestibular system involvement noted on today's exam.     3. Non-localizing Findings:    -Positive High Frequency Headshake; in the absence of a significant hypofunction this is a non-localizing finding.       PLAN:  Proceed with electrocochleography (EcochG) testing as scheduled. Follow-up with Dr. Rick Nissen regarding today's results and for medical management on 10/20/2020. Please call this clinic at [phone] with questions regarding these results or recommendations.       Shaheen Hook. CCC-A  Licensed Audiologist   [place] #[id number]

## 2020-10-02 NOTE — PROGRESS NOTES
AUDIOLOGY REPORT    BACKGROUND INFORMATION: Marni Juarez was seen in Audiology at the [facility] on 10/2/2020 for an electrocochleography (ECochG) evaluation, referred by Rick Nissen, M.D. The following case history was collected earlier today by Kushal Hook during balance testing appointment:    Patient reports symptoms originally onset 26 years ago while she was living in [place]. Symptoms then resolved until approximately one year later when she experienced a similar episode. She was then without symptoms until recently. In April 2020, patient reports experiencing a sudden onset of vertigo, dizziness, imbalance and nausea which resulted in emesis while sitting at her kitchen table. She sought emergency medical evaluation with [facility] and was prescribed a steroid, meclizine and Zofran. Patient also noted some right aural fullness and tinnitus at this time, which improved following her prescription of steroids. She does continue to note some persistent tinnitus in her right ear. Patient reports she was then asymptomatic until the second and third weeks of August 2020. Over the course of these two weeks, patient reports experiencing another six episodes; three while driving and three while doing other things. Patient describes her episodes as lasting hours in duration. Symptoms can be provoked or exacerbated by any positional changes, fast head or body movements, and with any eye movements, even minor eye movements. Patient then reports seeking evaluation with an otolaryngologist in [place]. She was reportedly prescribed another dose of steroids and a diuretic as Meniere's disease was suspected. Today, she reports she has been without vertigo since August. She reports experiencing a few small momentary incidences of dizziness upon bending forward a few days ago. She has otherwise been without symptoms.     Patient reports history of cancer as  "a child which resulted in the need for surgical removal of her left eye. She now utilizes her right eye only for vision and wears an eye patch over her left orbital area. Patient denies history of headaches, migraines, sensitivity to loud sounds, head injuries or concussions. Patient reports some sensitivity to bright lights. She denies tullio's phenomenon, hennebert's sign, and autophony. Patient denies sensation of persistent motion post cessation of motion. She denies sensation of motion while still. She denies double or blurred vision. Patient reports difficulty falling asleep at night. She also notes waking up in the middle of the night to her \"house shaking\" approximately four times a year. Patient reports additional medical history includes; blocked sublingual gland with subsequent surgery following a bout of the flu, bronchitis which turned into sinus concerns (winter 2019), branch retinal vein occlusion (1 year ago) with more recent inflammation in the same area in her right eye, and left knee injury with subsequent physical therapy (1 year ago). Family history is negative for hearing loss, migraines and vertigo.    Most recent hearing evaluation performed today revealed normal hearing in the left ear and moderate mixed hearing loss rising to normal hearing in the right ear. She reports right sided tinnitus. She denies aural pain, drainage, or history of ear surgeries.    TEST RESULTS AND PROCEDURES:   Electrocochleography (ECochG) testing is performed using an auditory evoked potentials system.  Surface electrodes are placed behind the test ear with extratympanic tymptrode placed in the test ear in order to obtain a near-field recording that measures the response of the cochlear hair cells and auditory nerve in response to auditory stimuli. The measured response consists of the summating potential (SP) and the cochlear nerve action potential (AP). Abnormalities may take the form of an increased summating " potential/compound action potential (SP/AP) ratio (due to an increased summating potential) in patients suspected of Meniere's disease (endolymphatic hydrops) or in those patients who have symptoms of vestibular dysfunction or ear symptoms including asymmetric or fluctuating hearing loss, tinnitus and/or aural fullness. The following can be suggestive of an abnormal EcochG: SP/AP ratio exceeds 0.43.  Using a microscope tympanic membranes were visualized. Tympanograms performed earlier today during hearing evaluation appointment showed normal eardrum mobility bilaterally.   A two-channel ECochG recording was performed for clicks bilaterally.  Clicks for the right ear showed normal SP/AP ratios.    Clicks for the left ear showed normal SP/AP ratios.         Click SP/AP ratio   Right ear  0.309   Left ear  0.215     Abnormal SP/AP ratios must be greater than .43 for clicks.     SUMMARY AND RECOMMENDATIONS: Today s ECochG revealed normal SP/AP ratios.  Call this clinic with questions regarding today s results.  Follow-up with Dr. Nissen for medical management.        Shaheen Galeano.  Licensed Audiologist  [place] # [id number]

## 2020-10-15 ENCOUNTER — DOCUMENTATION ONLY (OUTPATIENT)
Dept: CARE COORDINATION | Facility: CLINIC | Age: 55
End: 2020-10-15

## 2020-10-16 ENCOUNTER — TELEPHONE (OUTPATIENT)
Dept: OTOLARYNGOLOGY | Facility: CLINIC | Age: 55
End: 2020-10-16

## 2020-10-16 NOTE — PATIENT INSTRUCTIONS
1. You were seen in the ENT Clinic today by Dr. Nissen.  If you have any questions or concerns after your appointment, please call   - Option 1: ENT Clinic: [phone]   - Option 2: Alessandra (Dr. Nissen's Nurse): [phone]  2.   Plan to return to clinic in 6 months with audio    3. Dyazide- 1 capsule daily    4. Benadryl 50 mg- 1 capsule at bedtime    5. Watch Salt intake    Alessandra Keys LPN  [facility] - Otolaryngology

## 2020-10-16 NOTE — TELEPHONE ENCOUNTER
LVM to see if pt wants in person visit with Niseen instead of telephone. Gave call center number. Ok to convert to in person even though its a telehealth day

## 2020-10-16 NOTE — TELEPHONE ENCOUNTER
Offered in person visit with Dr. Nissen on Tues vs telephone. Gave number to call back. If the pt calls back, please switch to in person if pt chooses to.     Alessandra Keys LPN

## 2020-10-20 ENCOUNTER — PRE VISIT (OUTPATIENT)
Dept: OTOLARYNGOLOGY | Facility: CLINIC | Age: 55
End: 2020-10-20

## 2020-10-20 ENCOUNTER — OFFICE VISIT (OUTPATIENT)
Dept: OTOLARYNGOLOGY | Facility: CLINIC | Age: 55
End: 2020-10-20
Attending: OTOLARYNGOLOGY
Payer: COMMERCIAL

## 2020-10-20 VITALS — WEIGHT: 173 LBS | DIASTOLIC BLOOD PRESSURE: 82 MMHG | HEART RATE: 99 BPM | SYSTOLIC BLOOD PRESSURE: 125 MMHG

## 2020-10-20 DIAGNOSIS — H93.11 TINNITUS, RIGHT: ICD-10-CM

## 2020-10-20 DIAGNOSIS — R42 DIZZINESS: Primary | ICD-10-CM

## 2020-10-20 DIAGNOSIS — H81.01 MENIERE'S DISEASE, RIGHT: ICD-10-CM

## 2020-10-20 DIAGNOSIS — H61.23 EXCESSIVE CERUMEN IN BOTH EAR CANALS: ICD-10-CM

## 2020-10-20 PROCEDURE — 99204 OFFICE O/P NEW MOD 45 MIN: CPT | Mod: 25 | Performed by: OTOLARYNGOLOGY

## 2020-10-20 PROCEDURE — 92504 EAR MICROSCOPY EXAMINATION: CPT | Performed by: OTOLARYNGOLOGY

## 2020-10-20 RX ORDER — LINACLOTIDE 290 UG/1
CAPSULE, GELATIN COATED ORAL
COMMUNITY
Start: 2020-09-21

## 2020-10-20 RX ORDER — TRIAMTERENE AND HYDROCHLOROTHIAZIDE 37.5; 25 MG/1; MG/1
1 CAPSULE ORAL DAILY
Qty: 30 CAPSULE | Refills: 2 | Status: SHIPPED | OUTPATIENT
Start: 2020-10-20 | End: 2021-04-20

## 2020-10-20 RX ORDER — LATANOPROST 50 UG/ML
SOLUTION/ DROPS OPHTHALMIC
COMMUNITY
Start: 2019-12-02

## 2020-10-20 RX ORDER — DIPHENHYDRAMINE HCL 50 MG
50 CAPSULE ORAL AT BEDTIME
Qty: 60 CAPSULE | Refills: 2 | Status: SHIPPED | OUTPATIENT
Start: 2020-10-20 | End: 2020-11-19

## 2020-10-20 SDOH — HEALTH STABILITY: MENTAL HEALTH: HOW MANY STANDARD DRINKS CONTAINING ALCOHOL DO YOU HAVE ON A TYPICAL DAY?: NOT ASKED

## 2020-10-20 SDOH — HEALTH STABILITY: MENTAL HEALTH: HOW OFTEN DO YOU HAVE A DRINK CONTAINING ALCOHOL?: MONTHLY OR LESS

## 2020-10-20 SDOH — HEALTH STABILITY: MENTAL HEALTH: HOW OFTEN DO YOU HAVE 6 OR MORE DRINKS ON ONE OCCASION?: NOT ASKED

## 2020-10-20 ASSESSMENT — PAIN SCALES - GENERAL: PAINLEVEL: NO PAIN (0)

## 2020-10-20 NOTE — PROGRESS NOTES
Dear Xavier Cooper:    I had the pleasure of seeing Marni Juarez in followup today at the [facility].    CHIEF COMPLAINT: Dizziness    HISTORY OF PRESENT ILLNESS: Patient is a 55-year-old in today for assessment of dizziness.  She began having problems in April of this year.  She had a severe episode she woke up with of spinning vertigo with extreme nausea and vomiting.  It lasted for several hours.  Once the dizziness passed, her balance was fine.  In August she began having episodic vertigo again, having 6 episodes over a 2-week timeframe.  Each episode again would last for several hours associated with nausea and vomiting.  Since the episode in April, she has noticed the right hearing is down compared to the left.  She is not really notice fluctuation.  She has had right tinnitus as well.  Not really aware of right ear pressure.  She has meclizine and antinausea medication she uses as needed.  She did have an MRI scan which was negative.  She had a recent audiogram and VNG completed.    MEDICATIONS: Please refer to the detailed medication reconciliation performed by my nurse today, which I have reviewed and signed.     ALLERGIES:  Not on File    HABITS: Social History    Substance and Sexual Activity      Alcohol use: Yes        Frequency: Monthly or less     History   Smoking Status     Never Smoker   Smokeless Tobacco     Never Used         PAST MEDICAL HISTORY:  Please see today's intake form (for the remainder of the PMH) which I reviewed and signed.  History reviewed. No pertinent past medical history.    FAMILY HISTORY/SOCIAL HISTORY:  History reviewed. No pertinent family history.   Social History     Socioeconomic History     Marital status: Single     Spouse name: Not on file     Number of children: Not on file     Years of education: Not on file     Highest education level: Not on file   Occupational History     Not on file   Social Needs     Financial  resource strain: Not on file     Food insecurity     Worry: Not on file     Inability: Not on file     Transportation needs     Medical: Not on file     Non-medical: Not on file   Tobacco Use     Smoking status: Never Smoker     Smokeless tobacco: Never Used   Substance and Sexual Activity     Alcohol use: Yes     Frequency: Monthly or less     Drug use: Not on file     Sexual activity: Not on file   Lifestyle     Physical activity     Days per week: Not on file     Minutes per session: Not on file     Stress: Not on file   Relationships     Social connections     Talks on phone: Not on file     Gets together: Not on file     Attends Yarsanism service: Not on file     Active member of club or organization: Not on file     Attends meetings of clubs or organizations: Not on file     Relationship status: Not on file     Intimate partner violence     Fear of current or ex partner: Not on file     Emotionally abused: Not on file     Physically abused: Not on file     Forced sexual activity: Not on file   Other Topics Concern     Not on file   Social History Narrative     Not on file       REVIEW OF SYSTEMS: Patient Supplied Answers to Review of Systems   ENT ROS 10/20/2020   Constitutional Problems with sleep   Ears, Nose, Throat Ringing/noise in ears, Nasal congestion or drainage            The remainder of the 10 point ROS is negative    PHYSICIAL EXAMINATION:  Constitutional: The patient was well-groomed and in no acute distress.   Skin: Warm and pink.  Psychiatric: The patient's affect was calm, cooperative, and appropriate.   Respiratory: Breathing comfortably without stridor or exertion of accessory muscles.  Eyes: Pupils were equal and reactive. Extraocular movement intact.   Head: Normocephalic and atraumatic. No lesions or scars.  Ears: Patient placed under the microscope for microscopic evaluation and cleaning of cerumen which was obscuring full visualization and complete assessment of both TMs. Under high  power magnification, the right ear was examined and cleaned of cerumen using curet, alligator forceps, and suction.  After cleaning, TM is fully visualized and has normal position with normal middle ear aeration. The left ear was then cleaned and inspected using microscope, instruments and similar techniques. After cleaning of cerumen, the TM has normal position with normal aeration to middle ear.  Nose: Sinuses were nontender. Anterior rhinoscopy revealed midline septum and absence of purulence or polyps.  Oral Cavity: Normal tongue, floor of mouth, buccal mucosa, and palate. No abnormal lymph tissue in the oropharynx.   Neck: The parotid is soft without masses. Supple with normal laryngeal and tracheal landmarks.   Lymphatic: There is no palpable lymphadenopathy or other masses in the neck.   Neurologic: Alert and oriented x 3. Cranial nerves III-XI within normal limits. Voice quality normal.  Cerebellar Function Tests:  Grossly normal    Audiogram: Audiogram shows a low-frequency right sensorineural hearing loss with excellent discrimination in both ears at 100%.    VNG: Completed and shows normal calorics.  No significant indications of peripheral vestibular system involvement.    IMPRESSION AND PLAN:   1. Dizziness: Suspect right Ménière's based on description of episodic vertigo nausea and vomiting and normal in between attacks.  Also with right fluctuating sensorineural hearing loss and tinnitus.  We discussed this in detail.  Multiple questions answered.  Recommended begin diuretic with Dyazide and antihistamine with Benadryl at bedtime.  Discussed diet with salt and caffeine avoidance.  We are going to follow-up in 2 months to monitor.  She will come in sooner with any increased symptoms or problems and consider steroid injection.  2. Right asymmetrical sensorineural hearing loss: Feel secondary to Ménière's, especially with normal MRI.  Discussed this again in detail.  Will monitor.  3. Right tinnitus:  Secondary to sensorineural hearing loss and Ménière's, monitor.  4. Right Ménière's: Treatment as above.  5. Excessive cerumen: Cleaned today, no further treatment needed, monitor.    Thank you very much for the opportunity to participate in the care of your patient.    Rick L Nissen MD

## 2020-10-20 NOTE — NURSING NOTE
Chief Complaint   Patient presents with     Consult     vertigo      Blood pressure 125/82, pulse 99, weight 173 lb (78.5 kg).    Marshall Gutierrez LPN

## 2020-11-12 ENCOUNTER — THERAPY VISIT (OUTPATIENT)
Dept: PHYSICAL THERAPY | Facility: CLINIC | Age: 55
End: 2020-11-12
Payer: COMMERCIAL

## 2020-11-12 DIAGNOSIS — K56.41 OBSTRUCTIVE DEFECATION (H): ICD-10-CM

## 2020-11-12 DIAGNOSIS — N39.46 MIXED STRESS AND URGE URINARY INCONTINENCE: ICD-10-CM

## 2020-11-12 DIAGNOSIS — M62.89 PELVIC FLOOR DYSFUNCTION IN FEMALE: ICD-10-CM

## 2020-11-12 DIAGNOSIS — M99.05 SOMATIC DYSFUNCTION OF PELVIS REGION: ICD-10-CM

## 2020-11-12 PROCEDURE — 97161 PT EVAL LOW COMPLEX 20 MIN: CPT | Mod: GP | Performed by: PHYSICAL THERAPIST

## 2020-11-12 PROCEDURE — 97535 SELF CARE MNGMENT TRAINING: CPT | Mod: GP | Performed by: PHYSICAL THERAPIST
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
## 2020-11-17 ENCOUNTER — THERAPY VISIT (OUTPATIENT)
Dept: PHYSICAL THERAPY | Facility: CLINIC | Age: 55
End: 2020-11-17
Payer: COMMERCIAL

## 2020-11-17 DIAGNOSIS — M62.89 PELVIC FLOOR DYSFUNCTION IN FEMALE: ICD-10-CM

## 2020-11-17 DIAGNOSIS — K56.41 OBSTRUCTIVE DEFECATION (H): ICD-10-CM

## 2020-11-17 DIAGNOSIS — N39.46 MIXED STRESS AND URGE URINARY INCONTINENCE: ICD-10-CM

## 2020-11-17 DIAGNOSIS — M99.05 SOMATIC DYSFUNCTION OF PELVIS REGION: ICD-10-CM

## 2020-11-17 PROCEDURE — 97112 NEUROMUSCULAR REEDUCATION: CPT | Mod: GP | Performed by: PHYSICAL THERAPIST

## 2020-11-17 PROCEDURE — 97530 THERAPEUTIC ACTIVITIES: CPT | Mod: GP | Performed by: PHYSICAL THERAPIST

## 2020-11-17 PROCEDURE — 97110 THERAPEUTIC EXERCISES: CPT | Mod: GP | Performed by: PHYSICAL THERAPIST

## 2020-12-10 ENCOUNTER — THERAPY VISIT (OUTPATIENT)
Dept: PHYSICAL THERAPY | Facility: CLINIC | Age: 55
End: 2020-12-10
Payer: COMMERCIAL

## 2020-12-10 DIAGNOSIS — N39.46 MIXED STRESS AND URGE URINARY INCONTINENCE: ICD-10-CM

## 2020-12-10 DIAGNOSIS — M99.05 SOMATIC DYSFUNCTION OF PELVIS REGION: ICD-10-CM

## 2020-12-10 DIAGNOSIS — M62.89 PELVIC FLOOR DYSFUNCTION IN FEMALE: ICD-10-CM

## 2020-12-10 DIAGNOSIS — K56.41 OBSTRUCTIVE DEFECATION (H): ICD-10-CM

## 2020-12-10 PROCEDURE — 97140 MANUAL THERAPY 1/> REGIONS: CPT | Mod: GP | Performed by: PHYSICAL THERAPIST

## 2020-12-10 PROCEDURE — 97110 THERAPEUTIC EXERCISES: CPT | Mod: GP | Performed by: PHYSICAL THERAPIST

## 2020-12-10 PROCEDURE — 97530 THERAPEUTIC ACTIVITIES: CPT | Mod: GP | Performed by: PHYSICAL THERAPIST

## 2020-12-31 ENCOUNTER — THERAPY VISIT (OUTPATIENT)
Dept: PHYSICAL THERAPY | Facility: CLINIC | Age: 55
End: 2020-12-31
Payer: COMMERCIAL

## 2020-12-31 DIAGNOSIS — M99.05 SOMATIC DYSFUNCTION OF PELVIS REGION: ICD-10-CM

## 2020-12-31 DIAGNOSIS — M62.89 PELVIC FLOOR DYSFUNCTION IN FEMALE: ICD-10-CM

## 2020-12-31 DIAGNOSIS — K56.41 OBSTRUCTIVE DEFECATION (H): ICD-10-CM

## 2020-12-31 DIAGNOSIS — N39.46 MIXED STRESS AND URGE URINARY INCONTINENCE: ICD-10-CM

## 2020-12-31 PROCEDURE — 97110 THERAPEUTIC EXERCISES: CPT | Mod: GP | Performed by: PHYSICAL THERAPIST

## 2020-12-31 PROCEDURE — 97112 NEUROMUSCULAR REEDUCATION: CPT | Mod: GP | Performed by: PHYSICAL THERAPIST

## 2020-12-31 PROCEDURE — 97530 THERAPEUTIC ACTIVITIES: CPT | Mod: GP | Performed by: PHYSICAL THERAPIST

## 2021-01-12 ENCOUNTER — THERAPY VISIT (OUTPATIENT)
Dept: PHYSICAL THERAPY | Facility: CLINIC | Age: 56
End: 2021-01-12
Payer: COMMERCIAL

## 2021-01-12 DIAGNOSIS — M62.89 PELVIC FLOOR DYSFUNCTION IN FEMALE: ICD-10-CM

## 2021-01-12 DIAGNOSIS — K56.41 OBSTRUCTIVE DEFECATION (H): ICD-10-CM

## 2021-01-12 DIAGNOSIS — M99.05 SOMATIC DYSFUNCTION OF PELVIS REGION: ICD-10-CM

## 2021-01-12 DIAGNOSIS — N39.46 MIXED STRESS AND URGE URINARY INCONTINENCE: ICD-10-CM

## 2021-01-12 PROCEDURE — 97110 THERAPEUTIC EXERCISES: CPT | Mod: GP | Performed by: PHYSICAL THERAPIST

## 2021-01-12 PROCEDURE — 97530 THERAPEUTIC ACTIVITIES: CPT | Mod: GP | Performed by: PHYSICAL THERAPIST

## 2021-01-12 PROCEDURE — 97112 NEUROMUSCULAR REEDUCATION: CPT | Mod: GP | Performed by: PHYSICAL THERAPIST

## 2021-01-25 ENCOUNTER — THERAPY VISIT (OUTPATIENT)
Dept: PHYSICAL THERAPY | Facility: CLINIC | Age: 56
End: 2021-01-25
Payer: COMMERCIAL

## 2021-01-25 DIAGNOSIS — N39.46 MIXED STRESS AND URGE URINARY INCONTINENCE: ICD-10-CM

## 2021-01-25 DIAGNOSIS — M62.89 PELVIC FLOOR DYSFUNCTION IN FEMALE: ICD-10-CM

## 2021-01-25 DIAGNOSIS — M99.05 SOMATIC DYSFUNCTION OF PELVIS REGION: ICD-10-CM

## 2021-01-25 DIAGNOSIS — K56.41 OBSTRUCTIVE DEFECATION (H): ICD-10-CM

## 2021-01-25 PROCEDURE — 97530 THERAPEUTIC ACTIVITIES: CPT | Mod: GP | Performed by: PHYSICAL THERAPIST

## 2021-01-25 PROCEDURE — 97110 THERAPEUTIC EXERCISES: CPT | Mod: GP | Performed by: PHYSICAL THERAPIST

## 2021-01-25 PROCEDURE — 97112 NEUROMUSCULAR REEDUCATION: CPT | Mod: GP | Performed by: PHYSICAL THERAPIST

## 2021-01-25 NOTE — PROGRESS NOTES
"Subjective:  HPI  Physical Exam                    Objective:  System    Physical Exam    General     ROS    Assessment/Plan:    PROGRESS  REPORT    Progress reporting period is from 11/12/20 to 1/25/21.       SUBJECTIVE  Subjective changes noted by patient:  .  Subjective: I've been having more bladder urgency in the past 2 weeks. Prior to the past 2 wks I was seeing improvement in less urine leakage and urgency.  I've drank less water lately for no reason. I'm doing the kegals about 3 times a day, unsure if they help reduce the urge. The leakage may be related to caffeine drinks and holding my urine too long. I'm still having some constipation, at times less abdominal bloating- the abdominal massaging helps. I continue to have LBP most days.       Current pain level is 0/10(pelvic region)  .     Previous pain level was 0/10 pelvic region)   .   Changes in function:  Yes (See Goal flowsheet attached for changes in current functional level)  Adverse reaction to treatment or activity: None    OBJECTIVE  Changes noted in objective findings:  Yes,   Objective: increased pelvic floor muscle strength: MMT 3-/5 3-5\" hold, good relaxation phase, some improvement seen on perianal biofeedback: increased max PFMC 9 mv, 3-5\" hold, low resting level, improved neutral sitting posture and fair TrA activation in supine with verbal cues. Patient instructed in fluid/diet management, colonic massage, PFM coordination exercises, normal bowel habits/ defecation techniques.      ASSESSMENT/PLAN  Updated problem list and treatment plan: Diagnosis 1:  Obstructive defecation, PFM dysfunction, urinary incontienence Pain -  self management, education and home program  Decreased strength - therapeutic exercise, therapeutic activities and home program  Impaired muscle performance - biofeedback, neuro re-education and home program  Decreased function - therapeutic activities and home program  Impaired posture - neuro re-education and home " program  STG/LTGs have been met or progress has been made towards goals:  Yes (See Goal flow sheet completed today.)  Assessment of Progress: The patient's condition has potential to improve.  The patient's condition has exacerbated.  Patient is meeting short term goals and is progressing towards long term goals.  Self Management Plans:  Patient has been instructed in a home treatment program.  Patient  has been instructed in self management of symptoms.  I have re-evaluated this patient and find that the nature, scope, duration and intensity of the therapy is appropriate for the medical condition of the patient.  Marni continues to require the following intervention to meet STG and LTG's:  PT    Recommendations:  This patient would benefit from continued therapy.     Frequency:  2 X a month, once daily  Duration:  for 3 months        Please refer to the daily flowsheet for treatment today, total treatment time and time spent performing 1:1 timed codes.

## 2021-01-25 NOTE — LETTER
"[facility] PHYSICAL THERAPY  [address] [facility]  [facility] [id number] [phone]    2021    Re: Marni Juarez   :   1965  MRN:  [id number]   REFERRING PHYSICIAN:   Olimpia Roque    [facility]    Date of Initial Evaluation: 2021  Visits:  Rxs Used: 6  Reason for Referral:     Somatic dysfunction of pelvis region  Pelvic floor dysfunction in female  Mixed stress and urge urinary incontinence  Obstructive defecation     PROGRESS  REPORT    Progress reporting period is from 20 to 21.       SUBJECTIVE  Subjective changes noted by patient:  .  Subjective: I've been having more bladder urgency in the past 2 weeks. Prior to the past 2 wks I was seeing improvement in less urine leakage and urgency.  I've drank less water lately for no reason. I'm doing the kegals about 3 times a day, unsure if they help reduce the urge. The leakage may be related to caffeine drinks and holding my urine too long. I'm still having some constipation, at times less abdominal bloating- the abdominal massaging helps. I continue to have LBP most days.       Current pain level is 0/10(pelvic region)  .     Previous pain level was 0/10 pelvic region)   .   Changes in function:  Yes (See Goal flowsheet attached for changes in current functional level)  Adverse reaction to treatment or activity: None    OBJECTIVE  Changes noted in objective findings:  Yes,   Objective: increased pelvic floor muscle strength: MMT 3-/5 3-5\" hold, good relaxation phase, some improvement seen on perianal biofeedback: increased max PFMC 9 mv, 3-5\" hold, low resting level, improved neutral sitting posture and fair TrA activation in supine with verbal cues. Patient instructed in fluid/diet management, colonic massage, PFM coordination exercises, normal bowel habits/ defecation techniques.      ASSESSMENT/PLAN  Updated problem " list and treatment plan: Diagnosis 1:  Obstructive defecation, PFM dysfunction, urinary incontienence Pain -  self management, education and home program  Decreased strength - therapeutic exercise, therapeutic activities and home program  Re: Marni Juarez   :   1965      Impaired muscle performance - biofeedback, neuro re-education and home program  Decreased function - therapeutic activities and home program  Impaired posture - neuro re-education and home program  STG/LTGs have been met or progress has been made towards goals:  Yes (See Goal flow sheet completed today.)  Assessment of Progress: The patient's condition has potential to improve.  The patient's condition has exacerbated.  Patient is meeting short term goals and is progressing towards long term goals.  Self Management Plans:  Patient has been instructed in a home treatment program.  Patient  has been instructed in self management of symptoms.  I have re-evaluated this patient and find that the nature, scope, duration and intensity of the therapy is appropriate for the medical condition of the patient.  Marni continues to require the following intervention to meet STG and LTG's:  PT    Recommendations:  This patient would benefit from continued therapy.     Frequency:  2 X a month, once daily  Duration:  for 3 months    Thank you for your referral.      INQUIRIES  Therapist: Laura Ventura, PT   [facility]  [address] [place] [address]. [id number]  [facility] [address]  Phone: [phone]  Fax: [phone]

## 2021-02-23 ENCOUNTER — THERAPY VISIT (OUTPATIENT)
Dept: PHYSICAL THERAPY | Facility: CLINIC | Age: 56
End: 2021-02-23
Payer: COMMERCIAL

## 2021-02-23 DIAGNOSIS — M99.05 SOMATIC DYSFUNCTION OF PELVIS REGION: ICD-10-CM

## 2021-02-23 DIAGNOSIS — K56.41 OBSTRUCTIVE DEFECATION (H): ICD-10-CM

## 2021-02-23 DIAGNOSIS — N39.46 MIXED STRESS AND URGE URINARY INCONTINENCE: ICD-10-CM

## 2021-02-23 DIAGNOSIS — M62.89 PELVIC FLOOR DYSFUNCTION IN FEMALE: ICD-10-CM

## 2021-02-23 PROCEDURE — 97530 THERAPEUTIC ACTIVITIES: CPT | Mod: GP | Performed by: PHYSICAL THERAPIST

## 2021-02-23 PROCEDURE — 97110 THERAPEUTIC EXERCISES: CPT | Mod: GP | Performed by: PHYSICAL THERAPIST

## 2021-02-23 PROCEDURE — 97112 NEUROMUSCULAR REEDUCATION: CPT | Mod: GP | Performed by: PHYSICAL THERAPIST

## 2021-04-01 ENCOUNTER — DOCUMENTATION ONLY (OUTPATIENT)
Dept: CARE COORDINATION | Facility: CLINIC | Age: 56
End: 2021-04-01

## 2021-04-13 PROBLEM — M62.89 PELVIC FLOOR DYSFUNCTION IN FEMALE: Status: RESOLVED | Noted: 2020-11-12 | Resolved: 2021-04-13

## 2021-04-13 PROBLEM — N39.46 MIXED STRESS AND URGE URINARY INCONTINENCE: Status: RESOLVED | Noted: 2020-11-12 | Resolved: 2021-04-13

## 2021-04-13 PROBLEM — K56.41 OBSTRUCTIVE DEFECATION (H): Status: RESOLVED | Noted: 2020-11-12 | Resolved: 2021-04-13

## 2021-04-13 PROBLEM — M99.05 SOMATIC DYSFUNCTION OF PELVIS REGION: Status: RESOLVED | Noted: 2020-11-12 | Resolved: 2021-04-13

## 2021-04-13 NOTE — PROGRESS NOTES
Subjective:  HPI  Physical Exam                    Objective:  System    Physical Exam    General     ROS    Assessment/Plan:    DISCHARGE REPORT    Progress reporting period is from 1/25/21 to 2/23/21     SUBJECTIVE  Subjective: I've been more aware of my caffeine drinks and this helps to decrease bladder urgency. Less urine leakage in past 2-3 wks but still alot of urgency. I'm doing the kegels but probably not enough. I'm taking the Linzest with water in the AM and then have a BM. Stool is formed and usually 1 BM daily. My back is feeling some better, I think the exercsies help.           Changes in function: Yes, see goal flow sheet for change in function   Adverse reactions: None;   ,     Patient has failed to return to therapy so current objective findings are unknown.  The subjective and objective information are from the last SOAP note on this patient.    OBJECTIVE  Objective: review HEP emphasis to kegel ex. 3-4x daily, core/glut/PF 3x weekly, rev of proper defecation techniques, fiber in diet, instruct in monster walk for PF/core/glut training- danielito well, improved Tra stabilization with core and increased strength with bridge.      ASSESSMENT/PLAN  Updated problem list and treatment plan: Diagnosis 1:obstructive defecation, PFM dysfunction      STG/LTGs have been met or progress has been made towards goals:    Assessment of Progress: The patient has not returned to therapy. Current status is unknown.  Self Management Plans:  Patient has been instructed in a home treatment program.  Patient  has been instructed in self management of symptoms.    Marni continues to require the following intervention to meet STG and LTG's: PT  We will discharge this patient from PT.    Recommendations:  DC    Please refer to the daily flowsheet for treatment today, total treatment time and time spent performing 1:1 timed codes.

## 2021-04-19 NOTE — PATIENT INSTRUCTIONS
1. You were seen in the ENT Clinic today by Dr. Nissen.  If you have any questions or concerns after your appointment, please call   - Option 1: ENT Clinic: [phone]   - Option 2: Alessandra (Dr. Nissen's Nurse): [phone]    2.   Plan to return to clinic as needed    Alessandra Keys LPN  [facility] - Otolaryngology

## 2021-04-20 ENCOUNTER — OFFICE VISIT (OUTPATIENT)
Dept: AUDIOLOGY | Facility: CLINIC | Age: 56
End: 2021-04-20
Attending: OTOLARYNGOLOGY
Payer: COMMERCIAL

## 2021-04-20 ENCOUNTER — OFFICE VISIT (OUTPATIENT)
Dept: OTOLARYNGOLOGY | Facility: CLINIC | Age: 56
End: 2021-04-20
Payer: COMMERCIAL

## 2021-04-20 VITALS
RESPIRATION RATE: 19 BRPM | DIASTOLIC BLOOD PRESSURE: 79 MMHG | WEIGHT: 167.55 LBS | OXYGEN SATURATION: 98 % | HEIGHT: 62 IN | HEART RATE: 76 BPM | BODY MASS INDEX: 30.83 KG/M2 | SYSTOLIC BLOOD PRESSURE: 122 MMHG

## 2021-04-20 DIAGNOSIS — H93.13 TINNITUS, BILATERAL: ICD-10-CM

## 2021-04-20 DIAGNOSIS — H81.01 MENIERE'S DISEASE, RIGHT: Primary | ICD-10-CM

## 2021-04-20 DIAGNOSIS — R42 DIZZINESS AND GIDDINESS: Primary | ICD-10-CM

## 2021-04-20 DIAGNOSIS — H61.23 EXCESSIVE CERUMEN IN BOTH EAR CANALS: ICD-10-CM

## 2021-04-20 DIAGNOSIS — R42 DIZZINESS: ICD-10-CM

## 2021-04-20 PROCEDURE — 92550 TYMPANOMETRY & REFLEX THRESH: CPT | Performed by: AUDIOLOGIST

## 2021-04-20 PROCEDURE — 92504 EAR MICROSCOPY EXAMINATION: CPT | Performed by: OTOLARYNGOLOGY

## 2021-04-20 PROCEDURE — 92557 COMPREHENSIVE HEARING TEST: CPT | Performed by: AUDIOLOGIST

## 2021-04-20 PROCEDURE — 99213 OFFICE O/P EST LOW 20 MIN: CPT | Mod: 25 | Performed by: OTOLARYNGOLOGY

## 2021-04-20 RX ORDER — TRAZODONE HYDROCHLORIDE 50 MG/1
TABLET, FILM COATED ORAL
COMMUNITY
Start: 2021-02-27

## 2021-04-20 RX ORDER — ONDANSETRON 4 MG/1
4 TABLET, ORALLY DISINTEGRATING ORAL
COMMUNITY
Start: 2020-04-13

## 2021-04-20 ASSESSMENT — PAIN SCALES - GENERAL: PAINLEVEL: EXTREME PAIN (9)

## 2021-04-20 ASSESSMENT — PATIENT HEALTH QUESTIONNAIRE - PHQ9: SUM OF ALL RESPONSES TO PHQ QUESTIONS 1-9: 15

## 2021-04-20 ASSESSMENT — MIFFLIN-ST. JEOR: SCORE: 1298.25

## 2021-04-20 NOTE — PROGRESS NOTES
AUDIOLOGY REPORT    SUMMARY: Audiology visit completed. See audiogram for results.      RECOMMENDATIONS: Follow-up with ENT.    MASOOD Small.   Audiology Doctoral Extern  License #[id number]    I was present with the patient for the entire Audiology appointment including all procedures/testing performed by the AuD student, and agree with the student s assessment and plan as documented.    Shaheen Alan, [facility]  Licensed Audiologist  [place] License #[id number]

## 2021-04-20 NOTE — PROGRESS NOTES
Depression Response    Patient completed the PHQ-9 assessment for depression and scored >9? Yes  Question 9 on the PHQ-9 was positive for suicidality? No  Does patient have current mental health provider? No    Is this a virtual visit? No    I personally notified the following: visit providerPatient Phq 9 score is 15.Patient denies suicidal ideation. Patient does not currently have a mental health provider but will except a referral to one. Results reported to Doctor Nissen. Referral placed.  Marshall Gutierrez LPN

## 2021-04-20 NOTE — PROGRESS NOTES
Dear Xavier Cooper:    I had the pleasure of seeing Marni Juarez in followup today at the [facility].    CHIEF COMPLAINT: Right Ménière's    HISTORY OF PRESENT ILLNESS: Patient is a 55-year-old in today for follow-up on suspected right Ménière's, dizziness, and right hearing loss.  She was seen last October after having 6 episodes of vertigo over 2-week timeframe.  She was found to have a right low-frequency sensorineural hearing loss.  MRI scan was negative.  Felt she had right Ménière's and was recommended low-salt diet, diuretic and Benadryl.  She is in for follow-up with her daughter.  On follow-up today, she is not had any more episodes of vertigo.  She had some mild dizziness mid March that was felt to be secondary to a panic attack.  She again has not had any episodes of vertigo.  She feels her hearing is equal and symmetrical at this time.  Does have occasional tinnitus but not problematic.  She denies any dysphasia, hoarseness, facial paresthesias.  She stopped the diuretic, says she never really started that.  She has been on a low-salt diet.  Benadryl has been taking occasionally.  Again she has not had any dizziness or vertigo attacks since the episode.    MEDICATIONS: Please refer to the detailed medication reconciliation performed by my nurse today, which I have reviewed and signed.     ALLERGIES:  No Known Allergies    HABITS: Social History    Substance and Sexual Activity      Alcohol use: Yes        Frequency: Monthly or less     History   Smoking Status     Never Smoker   Smokeless Tobacco     Never Used         PAST MEDICAL HISTORY:  Please see today's intake form (for the remainder of the PMH) which I reviewed and signed.  History reviewed. No pertinent past medical history.    FAMILY HISTORY/SOCIAL HISTORY:  History reviewed. No pertinent family history.   Social History     Socioeconomic History     Marital status: Single     Spouse name: Not on  file     Number of children: Not on file     Years of education: Not on file     Highest education level: Not on file   Occupational History     Not on file   Social Needs     Financial resource strain: Not on file     Food insecurity     Worry: Not on file     Inability: Not on file     Transportation needs     Medical: Not on file     Non-medical: Not on file   Tobacco Use     Smoking status: Never Smoker     Smokeless tobacco: Never Used   Substance and Sexual Activity     Alcohol use: Yes     Frequency: Monthly or less     Drug use: Not on file     Sexual activity: Not on file   Lifestyle     Physical activity     Days per week: Not on file     Minutes per session: Not on file     Stress: Not on file   Relationships     Social connections     Talks on phone: Not on file     Gets together: Not on file     Attends Buddhism service: Not on file     Active member of club or organization: Not on file     Attends meetings of clubs or organizations: Not on file     Relationship status: Not on file     Intimate partner violence     Fear of current or ex partner: Not on file     Emotionally abused: Not on file     Physically abused: Not on file     Forced sexual activity: Not on file   Other Topics Concern     Not on file   Social History Narrative     Not on file       REVIEW OF SYSTEMS: [unfilled]    The remainder of the 10 point ROS is negative    PHYSICIAL EXAMINATION:  Constitutional: The patient was well-groomed and in no acute distress.   Skin: Warm and pink.  Psychiatric: The patient's affect was calm, cooperative, and appropriate.   Respiratory: Breathing comfortably without stridor or exertion of accessory muscles.  Eyes: Pupils were equal and reactive. Extraocular movement intact.   Head: Normocephalic and atraumatic. No lesions or scars.  Ears: Patient placed under the microscope for microscopic evaluation and cleaning of cerumen which was obscuring full visualization and complete assessment of both TMs. Under  high power magnification, the right ear was examined and cleaned of cerumen using curet, alligator forceps, and suction.  After cleaning, TM is fully visualized and has normal position with normal middle ear aeration. The left ear was then cleaned and inspected using microscope, instruments and similar techniques. After cleaning of cerumen, the TM has normal position with normal aeration to middle ear.  Nose: Sinuses were nontender. Anterior rhinoscopy revealed midline septum and absence of purulence or polyps.  Oral Cavity: Normal tongue, floor of mouth, buccal mucosa, and palate. No abnormal lymph tissue in the oropharynx.   Neck: The parotid is soft without masses. Supple with normal laryngeal and tracheal landmarks.   Lymphatic: There is no palpable lymphadenopathy or other masses in the neck.   Neurologic: Alert and oriented x 3. Cranial nerves III-XI within normal limits. Voice quality normal.  Cerebellar Function Tests:  Grossly normal    Audiogram: Audiogram shows normal hearing in both ears through all frequencies.  100% discrimination bilaterally.  Normal type A tympanograms bilaterally.    IMPRESSION AND PLAN:   1. Right Ménière's: Clinically stable, she stopped medication and since she is done so well, at this point will just follow along with you her hearing back to normal.  Should return with any return of vertigo or hearing change.  Monitor for now.  2. Bilateral tinnitus: Not problematic, no treatment needed, monitor  3. Dizziness: Controlled at this time, monitor  4. Excessive Cerumen: Cleaned, no treatment needed, monitor.    Thank you very much for the opportunity to participate in the care of your patient.    Rick L Nissen MD        Right Ménière's

## 2021-04-20 NOTE — LETTER
4/20/2021       RE: Marni Juarez  [address] [facility] [place] [id number]     Dear Colleague,    Thank you for referring your patient, Marni Juarez, to the [facility] [place] [facility]. Please see a copy of my visit note below.    Depression Response    Patient completed the PHQ-9 assessment for depression and scored >9? Yes  Question 9 on the PHQ-9 was positive for suicidality? No  Does patient have current mental health provider? No    Is this a virtual visit? No    I personally notified the following: visit providerPatient Phq 9 score is 15.Patient denies suicidal ideation. Patient does not currently have a mental health provider but will except a referral to one. Results reported to Doctor Nissen. Referral placed.  Marshall Gutierrez LPN               Dear Xavier Cooper:    I had the pleasure of seeing Marni Juarez in followup today at the [facility] Otolaryng[facility].    CHIEF COMPLAINT: Right Ménière's    HISTORY OF PRESENT ILLNESS: Patient is a 55-year-old in today for follow-up on suspected right Ménière's, dizziness, and right hearing loss.  She was seen last October after having 6 episodes of vertigo over 2-week timeframe.  She was found to have a right low-frequency sensorineural hearing loss.  MRI scan was negative.  Felt she had right Ménière's and was recommended low-salt diet, diuretic and Benadryl.  She is in for follow-up with her daughter.  On follow-up today, she is not had any more episodes of vertigo.  She had some mild dizziness mid March that was felt to be secondary to a panic attack.  She again has not had any episodes of vertigo.  She feels her hearing is equal and symmetrical at this time.  Does have occasional tinnitus but not problematic.  She denies any dysphasia, hoarseness, facial paresthesias.  She stopped the diuretic, says she never really started that.   She has been on a low-salt diet.  Benadryl has been taking occasionally.  Again she has not had any dizziness or vertigo attacks since the episode.    MEDICATIONS: Please refer to the detailed medication reconciliation performed by my nurse today, which I have reviewed and signed.     ALLERGIES:  No Known Allergies    HABITS: Social History    Substance and Sexual Activity      Alcohol use: Yes        Frequency: Monthly or less     History   Smoking Status     Never Smoker   Smokeless Tobacco     Never Used         PAST MEDICAL HISTORY:  Please see today's intake form (for the remainder of the PMH) which I reviewed and signed.  History reviewed. No pertinent past medical history.    FAMILY HISTORY/SOCIAL HISTORY:  History reviewed. No pertinent family history.   Social History     Socioeconomic History     Marital status: Single     Spouse name: Not on file     Number of children: Not on file     Years of education: Not on file     Highest education level: Not on file   Occupational History     Not on file   Social Needs     Financial resource strain: Not on file     Food insecurity     Worry: Not on file     Inability: Not on file     Transportation needs     Medical: Not on file     Non-medical: Not on file   Tobacco Use     Smoking status: Never Smoker     Smokeless tobacco: Never Used   Substance and Sexual Activity     Alcohol use: Yes     Frequency: Monthly or less     Drug use: Not on file     Sexual activity: Not on file   Lifestyle     Physical activity     Days per week: Not on file     Minutes per session: Not on file     Stress: Not on file   Relationships     Social connections     Talks on phone: Not on file     Gets together: Not on file     Attends Taoist service: Not on file     Active member of club or organization: Not on file     Attends meetings of clubs or organizations: Not on file     Relationship status: Not on file     Intimate partner violence     Fear of current or ex partner: Not on  file     Emotionally abused: Not on file     Physically abused: Not on file     Forced sexual activity: Not on file   Other Topics Concern     Not on file   Social History Narrative     Not on file       REVIEW OF SYSTEMS: [unfilled]    The remainder of the 10 point ROS is negative    PHYSICIAL EXAMINATION:  Constitutional: The patient was well-groomed and in no acute distress.   Skin: Warm and pink.  Psychiatric: The patient's affect was calm, cooperative, and appropriate.   Respiratory: Breathing comfortably without stridor or exertion of accessory muscles.  Eyes: Pupils were equal and reactive. Extraocular movement intact.   Head: Normocephalic and atraumatic. No lesions or scars.  Ears: Patient placed under the microscope for microscopic evaluation and cleaning of cerumen which was obscuring full visualization and complete assessment of both TMs. Under high power magnification, the right ear was examined and cleaned of cerumen using curet, alligator forceps, and suction.  After cleaning, TM is fully visualized and has normal position with normal middle ear aeration. The left ear was then cleaned and inspected using microscope, instruments and similar techniques. After cleaning of cerumen, the TM has normal position with normal aeration to middle ear.  Nose: Sinuses were nontender. Anterior rhinoscopy revealed midline septum and absence of purulence or polyps.  Oral Cavity: Normal tongue, floor of mouth, buccal mucosa, and palate. No abnormal lymph tissue in the oropharynx.   Neck: The parotid is soft without masses. Supple with normal laryngeal and tracheal landmarks.   Lymphatic: There is no palpable lymphadenopathy or other masses in the neck.   Neurologic: Alert and oriented x 3. Cranial nerves III-XI within normal limits. Voice quality normal.  Cerebellar Function Tests:  Grossly normal    Audiogram: Audiogram shows normal hearing in both ears through all frequencies.  100% discrimination bilaterally.  Normal  type A tympanograms bilaterally.    IMPRESSION AND PLAN:   1. Right Ménière's: Clinically stable, she stopped medication and since she is done so well, at this point will just follow along with you her hearing back to normal.  Should return with any return of vertigo or hearing change.  Monitor for now.  2. Bilateral tinnitus: Not problematic, no treatment needed, monitor  3. Dizziness: Controlled at this time, monitor  4. Excessive Cerumen: Cleaned, no treatment needed, monitor.    Thank you very much for the opportunity to participate in the care of your patient.    Rick L Nissen MD        Right Ménière's      Again, thank you for allowing me to participate in the care of your patient.      Sincerely,    Rick L. Nissen, MD

## 2021-04-20 NOTE — NURSING NOTE
"Chief Complaint   Patient presents with     RECHECK     follow up      Blood pressure 122/79, pulse 76, resp. rate 19, height 1.575 m (5' 2\"), weight 76 kg (167 lb 8.8 oz), SpO2 98 %.    Marshall Gutierrez LPN    "

## 2021-04-27 ENCOUNTER — TELEPHONE (OUTPATIENT)
Dept: BEHAVIORAL HEALTH | Facility: CLINIC | Age: 56
End: 2021-04-27

## 2021-04-27 NOTE — TELEPHONE ENCOUNTER
The following attempts were made by writer to contact client regarding Dr. Nissen's referral to see a psychotherapist  for treatment of depression:    4/27 voice message left 4/26 voice message left 4/22 spoke with her she took my number and requested a call back as she was helping [facility] with homework     No further attempts will be made to contact client unless otherwise advised.

## 2022-02-23 ENCOUNTER — TELEPHONE (OUTPATIENT)
Dept: UROLOGY | Facility: CLINIC | Age: 57
End: 2022-02-23
Payer: COMMERCIAL

## 2022-02-23 NOTE — TELEPHONE ENCOUNTER
Patient called into clinic stating that she has a history of kidney infections dating most recently to August of last year.  She was seen and treated in the emergency department.  She would like to follow-up with a urologist regarding this.  She requested Dr. Ramirez as this is her mother's urologist.  She declined his next available appointment on April 11.  Appointment made with Dr. Grace on March 24.    Carol LI RN Urology 2/23/2022 9:39 AM

## 2022-04-12 ENCOUNTER — OFFICE VISIT (OUTPATIENT)
Dept: UROLOGY | Facility: CLINIC | Age: 57
End: 2022-04-12
Payer: COMMERCIAL

## 2022-04-12 VITALS
TEMPERATURE: 97.7 F | SYSTOLIC BLOOD PRESSURE: 133 MMHG | DIASTOLIC BLOOD PRESSURE: 80 MMHG | OXYGEN SATURATION: 99 % | HEART RATE: 87 BPM

## 2022-04-12 DIAGNOSIS — R35.0 FREQUENCY OF MICTURITION: Primary | ICD-10-CM

## 2022-04-12 DIAGNOSIS — Z87.440 PERSONAL HISTORY OF URINARY TRACT INFECTION: ICD-10-CM

## 2022-04-12 DIAGNOSIS — M54.9 BILATERAL BACK PAIN, UNSPECIFIED BACK LOCATION, UNSPECIFIED CHRONICITY: ICD-10-CM

## 2022-04-12 LAB
ALBUMIN UR-MCNC: NEGATIVE MG/DL
APPEARANCE UR: CLEAR
BILIRUB UR QL STRIP: NEGATIVE
COLOR UR AUTO: YELLOW
GLUCOSE UR STRIP-MCNC: NEGATIVE MG/DL
HGB UR QL STRIP: NEGATIVE
KETONES UR STRIP-MCNC: NEGATIVE MG/DL
LEUKOCYTE ESTERASE UR QL STRIP: NEGATIVE
NITRATE UR QL: NEGATIVE
PH UR STRIP: 7 [PH] (ref 5–7)
SP GR UR STRIP: 1.02 (ref 1–1.03)
UROBILINOGEN UR STRIP-ACNC: 0.2 E.U./DL

## 2022-04-12 PROCEDURE — 99203 OFFICE O/P NEW LOW 30 MIN: CPT | Performed by: UROLOGY

## 2022-04-12 PROCEDURE — 81003 URINALYSIS AUTO W/O SCOPE: CPT | Performed by: UROLOGY

## 2022-04-12 NOTE — PROGRESS NOTES
S: Patient is a 56-year-old  female who was seen for a consultation with regard to patient's recent back pain with history of UTIs.  In August of last year she was seen for back pain was found to have pyelonephritis with urine culture showed evidence of E. coli.  She felt sick for several days before coming to see her doctor.  Again she was diagnosed with another urinary tract infection 2 months afterwards.  CT scan of abdomen pelvis in August showed small renal stones otherwise unremarkable.  She has noticed some pain in the rib area especially with deep breathing with some urine frequency which she attributes to drinking Mountain Dew.  She has no fever nausea vomiting.  Current Outpatient Medications   Medication Sig Dispense Refill     latanoprost (XALATAN) 0.005 % ophthalmic solution INSTILL 1 DROP INTO RIGHT EYE AT BEDTIME       LINZESS 290 MCG capsule TAKE 1 CAPSULE BY MOUTH ONCE DAILY ON A EMPTY STOMACH AT LEAST 30 MINUTES BEFORE FIRST MEAL OF THE DAY (Patient not taking: Reported on 4/12/2022)       ondansetron (ZOFRAN-ODT) 4 MG ODT tab Take 4 mg by mouth (Patient not taking: Reported on 4/12/2022)       traZODone (DESYREL) 50 MG tablet  (Patient not taking: Reported on 4/12/2022)       No Known Allergies  No past medical history on file.  No past surgical history on file.   No family history on file.  Social History     Socioeconomic History     Marital status: Single     Spouse name: None     Number of children: None     Years of education: None     Highest education level: None   Tobacco Use     Smoking status: Never Smoker     Smokeless tobacco: Never Used   Substance and Sexual Activity     Alcohol use: Yes       REVIEW OF SYSTEMS  =================  C: NEGATIVE for fever, chills, change in weight  I: NEGATIVE for worrisome rashes, moles or lesions  E/M: NEGATIVE for ear, mouth and throat problems  R: NEGATIVE for significant cough or SHORTNESS OF BREATH  CV:  NEGATIVE for chest pain,  palpitations or peripheral edema  GI: NEGATIVE for nausea, abdominal pain, heartburn, or change in bowel habits  NEURO: NEGATIVE numbness/weakness  : see HPI  PSYCH: NEGATIVE depression/anxiety  LYmph: no new enlarged lymph nodes  Ortho: no new trauma/movements      Physical Exam:  /80 (BP Location: Right arm, Patient Position: Chair, Cuff Size: Adult Large)   Pulse 87   Temp 97.7  F (36.5  C)   SpO2 99%    Patient is pleasant, in no acute distress, good general condition.  Heart:  negative, PMI normal  Lung: no evidence of respiratory distress    Abdomen: Soft, nondistended, non tender. No masses. No rebound or guarding.   Exam: Normal female  Skin: Warm and dry.  No redness.  Neuro: grossly normal  Musculaskeletal: moving all extremities  Psych normal mood and affect  Musculoskeletal  moving all extremities  Hematologic/Lymphatic/Immunologic: normal ant/post cervical, axillary, supraclavicular and inguinal nodes    Assessment/Plan:       Pleasant 56-year-old female with history of UTIs with complaint of back pain.  Her urine test today is unremarkable.  Symptoms most likely musculoskeletal.  She was reassured.  Follow-up with me as needed.  Okay to check urine test again if symptomatic.

## 2022-12-08 NOTE — MR AVS SNAPSHOT
After Visit Summary   8/24/2018    Marni Juarez    MRN: [id number]           Patient Information     Date Of Birth          1965        Visit Information        Provider Department      8/24/2018 10:50 AM Jan Ronquillo, PT [facility]        Today's Diagnoses     Neck sprain, initial encounter        Back strain, initial encounter           Follow-ups after your visit        Your next 10 appointments already scheduled     Aug 28, 2018 10:50 AM CDT   [facility] with Jan Velizol, PT   [facility] ([facility]    [address] [facility] [facility] [address]  [facility] [address]   [phone]            Aug 31, 2018 10:50 AM CDT   [facility] with Jan Velizol, PT   [facility] ([facility]    [address] [address]   [phone]            Sep 06, 2018 10:10 AM CDT   [facility] with Jan Velizol, PT   [facility] ([facility]    [address] [facility] [facility] [address]  [facility] [address]   [phone]            Sep 10, 2018 10:50 AM CDT   [facility] with Mary Her, PTA   [facility] ([facility])    [address]  [facility] [address]   [phone]              Who to contact     If you have questions or need follow up information about today's clinic visit or your schedule please contact [facility] directly at [phone].  Normal or non-critical lab and imaging results will be communicated to you by MyChart, letter or phone within 4 business days after the clinic has received the results. If you do not hear from us within 7 days, please contact the clinic through MyChart or phone. If you have a critical or abnormal lab result, we will notify you by phone as soon as possible.  Submit refill requests through [employer] or call your pharmacy and they will forward the refill request to us. Please allow 3 business days for your refill to be completed.          Additional Information About Your Visit        Care  EveryWhere ID     This is your Care EveryWhere ID. This could be used by other organizations to access your [facility] medical records  [id number]         Blood Pressure from Last 3 Encounters:   No data found for BP    Weight from Last 3 Encounters:   No data found for Wt              We Performed the Following     MANUAL THER TECH,1+REGIONS,EA 15 MIN     MECHANICAL TRACTION THERAPY     THERAPEUTIC EXERCISES        Primary Care Provider Office Phone # Fax #    Xavier Lyn [phone] [address] [facility] [place] [address]        Equal Access to Services     [facility]: Hadii aad ku hadasho Soomaali, waaxda luqadaha, qaybta kaalmada adeegyada, waxay idiin hayaan adeeg kharamaciej [facility]cleod . So [facility] [phone].    ATENCIÓN: Si habla español, tiene a russell disposición servicios gratuitos de asistencia lingüística. Ll[facility] [phone].    We comply with applicable federal civil rights laws and [place] laws. We do not discriminate on the basis of race, color, national origin, age, disability, sex, sexual orientation, or gender identity.            Thank you!     Thank you for choosing [facility]  for your care. Our goal is always to provide you with excellent care. Hearing back from our patients is one way we can continue to improve our services. Please take a few minutes to complete the written survey that you may receive in the mail after your visit with us. Thank you!             Your Updated Medication List - Protect others around you: Learn how to safely use, store and throw away your medicines at www.disposemymeds.org.      Notice  As of 8/24/2018 12:46 PM    You have not been prescribed any medications.       IMPROVE-DD Application Not Available